# Patient Record
Sex: MALE | Race: WHITE | Employment: FULL TIME | URBAN - METROPOLITAN AREA
[De-identification: names, ages, dates, MRNs, and addresses within clinical notes are randomized per-mention and may not be internally consistent; named-entity substitution may affect disease eponyms.]

---

## 2017-01-24 ENCOUNTER — TELEPHONE (OUTPATIENT)
Dept: FAMILY MEDICINE CLINIC | Age: 52
End: 2017-01-24

## 2017-02-06 ENCOUNTER — OFFICE VISIT (OUTPATIENT)
Dept: FAMILY MEDICINE CLINIC | Age: 52
End: 2017-02-06

## 2017-02-06 VITALS
HEART RATE: 78 BPM | SYSTOLIC BLOOD PRESSURE: 138 MMHG | HEIGHT: 68 IN | TEMPERATURE: 97.7 F | OXYGEN SATURATION: 97 % | BODY MASS INDEX: 33.83 KG/M2 | WEIGHT: 223.2 LBS | DIASTOLIC BLOOD PRESSURE: 90 MMHG

## 2017-02-06 DIAGNOSIS — E66.01 MORBID OBESITY DUE TO EXCESS CALORIES (HCC): ICD-10-CM

## 2017-02-06 DIAGNOSIS — J01.00 ACUTE NON-RECURRENT MAXILLARY SINUSITIS: Primary | ICD-10-CM

## 2017-02-06 DIAGNOSIS — G47.30 SLEEP APNEA, UNSPECIFIED TYPE: ICD-10-CM

## 2017-02-06 DIAGNOSIS — I10 ESSENTIAL HYPERTENSION: ICD-10-CM

## 2017-02-06 PROCEDURE — G8427 DOCREV CUR MEDS BY ELIG CLIN: HCPCS | Performed by: FAMILY MEDICINE

## 2017-02-06 PROCEDURE — 99214 OFFICE O/P EST MOD 30 MIN: CPT | Performed by: FAMILY MEDICINE

## 2017-02-06 PROCEDURE — 1036F TOBACCO NON-USER: CPT | Performed by: FAMILY MEDICINE

## 2017-02-06 PROCEDURE — G8419 CALC BMI OUT NRM PARAM NOF/U: HCPCS | Performed by: FAMILY MEDICINE

## 2017-02-06 PROCEDURE — G8484 FLU IMMUNIZE NO ADMIN: HCPCS | Performed by: FAMILY MEDICINE

## 2017-02-06 RX ORDER — AMOXICILLIN AND CLAVULANATE POTASSIUM 875; 125 MG/1; MG/1
1 TABLET, FILM COATED ORAL 2 TIMES DAILY
Qty: 20 TABLET | Refills: 0 | Status: SHIPPED | OUTPATIENT
Start: 2017-02-06 | End: 2017-02-16

## 2017-02-06 RX ORDER — OXYMETAZOLINE HYDROCHLORIDE 0.05 G/100ML
2 SPRAY NASAL 2 TIMES DAILY
Qty: 14.7 ML | Refills: 3 | Status: SHIPPED | OUTPATIENT
Start: 2017-02-06 | End: 2017-03-24

## 2017-02-06 RX ORDER — AMLODIPINE BESYLATE 5 MG/1
5 TABLET ORAL DAILY
Qty: 30 TABLET | Refills: 3 | Status: SHIPPED | OUTPATIENT
Start: 2017-02-06 | End: 2017-05-28 | Stop reason: SDUPTHER

## 2017-02-06 ASSESSMENT — ENCOUNTER SYMPTOMS
DIARRHEA: 0
SORE THROAT: 0
RHINORRHEA: 0
CONSTIPATION: 0
WHEEZING: 0
ABDOMINAL PAIN: 0
COUGH: 1
SINUS PRESSURE: 1
SHORTNESS OF BREATH: 0

## 2017-02-22 ENCOUNTER — TELEPHONE (OUTPATIENT)
Dept: FAMILY MEDICINE CLINIC | Age: 52
End: 2017-02-22

## 2017-02-22 DIAGNOSIS — Z12.11 COLON CANCER SCREENING: Primary | ICD-10-CM

## 2017-02-22 LAB
CONTROL: YES
HEMOCCULT STL QL: NORMAL

## 2017-02-22 PROCEDURE — 82274 ASSAY TEST FOR BLOOD FECAL: CPT | Performed by: FAMILY MEDICINE

## 2017-03-07 ENCOUNTER — OFFICE VISIT (OUTPATIENT)
Dept: FAMILY MEDICINE CLINIC | Age: 52
End: 2017-03-07

## 2017-03-07 VITALS
TEMPERATURE: 97.9 F | SYSTOLIC BLOOD PRESSURE: 126 MMHG | DIASTOLIC BLOOD PRESSURE: 84 MMHG | RESPIRATION RATE: 18 BRPM | HEART RATE: 76 BPM | WEIGHT: 225 LBS | HEIGHT: 68 IN | BODY MASS INDEX: 34.1 KG/M2 | OXYGEN SATURATION: 99 %

## 2017-03-07 DIAGNOSIS — J01.01 ACUTE RECURRENT MAXILLARY SINUSITIS: Primary | ICD-10-CM

## 2017-03-07 PROCEDURE — G8427 DOCREV CUR MEDS BY ELIG CLIN: HCPCS | Performed by: PHYSICIAN ASSISTANT

## 2017-03-07 PROCEDURE — 99213 OFFICE O/P EST LOW 20 MIN: CPT | Performed by: PHYSICIAN ASSISTANT

## 2017-03-07 PROCEDURE — G8419 CALC BMI OUT NRM PARAM NOF/U: HCPCS | Performed by: PHYSICIAN ASSISTANT

## 2017-03-07 PROCEDURE — G8484 FLU IMMUNIZE NO ADMIN: HCPCS | Performed by: PHYSICIAN ASSISTANT

## 2017-03-07 PROCEDURE — 1036F TOBACCO NON-USER: CPT | Performed by: PHYSICIAN ASSISTANT

## 2017-03-07 RX ORDER — AZITHROMYCIN 250 MG/1
TABLET, FILM COATED ORAL
Qty: 1 PACKET | Refills: 0 | Status: SHIPPED | OUTPATIENT
Start: 2017-03-07 | End: 2017-03-17

## 2017-03-07 ASSESSMENT — ENCOUNTER SYMPTOMS
COUGH: 0
SINUS PRESSURE: 1
SORE THROAT: 0
RHINORRHEA: 1
SHORTNESS OF BREATH: 0
SINUS COMPLAINT: 1

## 2017-03-07 ASSESSMENT — PATIENT HEALTH QUESTIONNAIRE - PHQ9
SUM OF ALL RESPONSES TO PHQ9 QUESTIONS 1 & 2: 0
1. LITTLE INTEREST OR PLEASURE IN DOING THINGS: 0
SUM OF ALL RESPONSES TO PHQ QUESTIONS 1-9: 0
2. FEELING DOWN, DEPRESSED OR HOPELESS: 0

## 2017-03-10 RX ORDER — DICYCLOMINE HCL 20 MG
20 TABLET ORAL 3 TIMES DAILY PRN
Qty: 120 TABLET | Refills: 3 | Status: SHIPPED | OUTPATIENT
Start: 2017-03-10

## 2017-03-24 ENCOUNTER — OFFICE VISIT (OUTPATIENT)
Dept: FAMILY MEDICINE CLINIC | Age: 52
End: 2017-03-24

## 2017-03-24 VITALS
DIASTOLIC BLOOD PRESSURE: 82 MMHG | TEMPERATURE: 99.1 F | WEIGHT: 220.8 LBS | SYSTOLIC BLOOD PRESSURE: 134 MMHG | HEART RATE: 78 BPM | OXYGEN SATURATION: 95 % | BODY MASS INDEX: 33.57 KG/M2

## 2017-03-24 DIAGNOSIS — R09.82 POSTNASAL DRIP: ICD-10-CM

## 2017-03-24 DIAGNOSIS — J01.01 ACUTE RECURRENT MAXILLARY SINUSITIS: ICD-10-CM

## 2017-03-24 DIAGNOSIS — G93.31 POSTVIRAL SYNDROME: Primary | ICD-10-CM

## 2017-03-24 DIAGNOSIS — I10 ESSENTIAL HYPERTENSION: ICD-10-CM

## 2017-03-24 PROCEDURE — 99214 OFFICE O/P EST MOD 30 MIN: CPT | Performed by: FAMILY MEDICINE

## 2017-03-24 PROCEDURE — G8417 CALC BMI ABV UP PARAM F/U: HCPCS | Performed by: FAMILY MEDICINE

## 2017-03-24 PROCEDURE — G8484 FLU IMMUNIZE NO ADMIN: HCPCS | Performed by: FAMILY MEDICINE

## 2017-03-24 PROCEDURE — 1036F TOBACCO NON-USER: CPT | Performed by: FAMILY MEDICINE

## 2017-03-24 PROCEDURE — G8427 DOCREV CUR MEDS BY ELIG CLIN: HCPCS | Performed by: FAMILY MEDICINE

## 2017-03-24 RX ORDER — NAPROXEN 500 MG/1
500 TABLET ORAL 2 TIMES DAILY WITH MEALS
Qty: 60 TABLET | Refills: 3 | Status: SHIPPED | OUTPATIENT
Start: 2017-03-24

## 2017-03-24 RX ORDER — FLUTICASONE PROPIONATE 50 MCG
1 SPRAY, SUSPENSION (ML) NASAL DAILY
Qty: 1 BOTTLE | Refills: 3 | Status: SHIPPED | OUTPATIENT
Start: 2017-03-24 | End: 2018-03-02 | Stop reason: SDUPTHER

## 2017-03-24 RX ORDER — BENZONATATE 100 MG/1
100 CAPSULE ORAL 3 TIMES DAILY PRN
Qty: 21 CAPSULE | Refills: 0 | Status: SHIPPED | OUTPATIENT
Start: 2017-03-24 | End: 2017-03-31

## 2017-03-24 ASSESSMENT — ENCOUNTER SYMPTOMS
SORE THROAT: 0
CONSTIPATION: 0
SHORTNESS OF BREATH: 0
ABDOMINAL PAIN: 0
DIARRHEA: 0
COUGH: 0
WHEEZING: 0
RHINORRHEA: 0

## 2017-03-25 ENCOUNTER — APPOINTMENT (OUTPATIENT)
Dept: GENERAL RADIOLOGY | Age: 52
End: 2017-03-25
Payer: COMMERCIAL

## 2017-03-25 ENCOUNTER — HOSPITAL ENCOUNTER (EMERGENCY)
Age: 52
Discharge: HOME | End: 2017-03-25
Attending: EMERGENCY MEDICINE
Payer: COMMERCIAL

## 2017-03-25 VITALS
HEIGHT: 68 IN | OXYGEN SATURATION: 94 % | TEMPERATURE: 98.2 F | RESPIRATION RATE: 18 BRPM | BODY MASS INDEX: 32.43 KG/M2 | DIASTOLIC BLOOD PRESSURE: 86 MMHG | HEART RATE: 83 BPM | WEIGHT: 214 LBS | SYSTOLIC BLOOD PRESSURE: 123 MMHG

## 2017-03-25 DIAGNOSIS — J01.10 ACUTE FRONTAL SINUSITIS, RECURRENCE NOT SPECIFIED: ICD-10-CM

## 2017-03-25 DIAGNOSIS — J40 BRONCHITIS: Primary | ICD-10-CM

## 2017-03-25 LAB
RAPID INFLUENZA  B AGN: NEGATIVE
RAPID INFLUENZA A AGN: NEGATIVE

## 2017-03-25 PROCEDURE — 86403 PARTICLE AGGLUT ANTBDY SCRN: CPT

## 2017-03-25 PROCEDURE — 71020 XR CHEST STANDARD TWO VW: CPT

## 2017-03-25 PROCEDURE — 6370000000 HC RX 637 (ALT 250 FOR IP): Performed by: EMERGENCY MEDICINE

## 2017-03-25 PROCEDURE — 6360000002 HC RX W HCPCS: Performed by: EMERGENCY MEDICINE

## 2017-03-25 PROCEDURE — 99284 EMERGENCY DEPT VISIT MOD MDM: CPT

## 2017-03-25 PROCEDURE — 94640 AIRWAY INHALATION TREATMENT: CPT

## 2017-03-25 RX ORDER — GUAIFENESIN AND CODEINE PHOSPHATE 100; 10 MG/5ML; MG/5ML
10 SOLUTION ORAL 3 TIMES DAILY PRN
Qty: 200 ML | Refills: 0 | Status: SHIPPED | OUTPATIENT
Start: 2017-03-25 | End: 2017-05-19 | Stop reason: ALTCHOICE

## 2017-03-25 RX ORDER — ALBUTEROL SULFATE 90 UG/1
AEROSOL, METERED RESPIRATORY (INHALATION)
Qty: 1 INHALER | Refills: 0 | Status: SHIPPED | OUTPATIENT
Start: 2017-03-25 | End: 2018-08-31

## 2017-03-25 RX ORDER — DEXAMETHASONE 4 MG/1
8 TABLET ORAL ONCE
Status: COMPLETED | OUTPATIENT
Start: 2017-03-25 | End: 2017-03-25

## 2017-03-25 RX ORDER — AMOXICILLIN AND CLAVULANATE POTASSIUM 875; 125 MG/1; MG/1
1 TABLET, FILM COATED ORAL 2 TIMES DAILY
Qty: 20 TABLET | Refills: 0 | Status: SHIPPED | OUTPATIENT
Start: 2017-03-25 | End: 2017-04-04

## 2017-03-25 RX ORDER — IPRATROPIUM BROMIDE AND ALBUTEROL SULFATE 2.5; .5 MG/3ML; MG/3ML
1 SOLUTION RESPIRATORY (INHALATION) ONCE
Status: COMPLETED | OUTPATIENT
Start: 2017-03-25 | End: 2017-03-25

## 2017-03-25 RX ORDER — PREDNISONE 20 MG/1
20 TABLET ORAL DAILY
Qty: 5 TABLET | Refills: 0 | Status: SHIPPED | OUTPATIENT
Start: 2017-03-25 | End: 2017-03-30

## 2017-03-25 RX ADMIN — DEXAMETHASONE 8 MG: 4 TABLET ORAL at 08:54

## 2017-03-25 RX ADMIN — IPRATROPIUM BROMIDE AND ALBUTEROL SULFATE 1 AMPULE: .5; 3 SOLUTION RESPIRATORY (INHALATION) at 08:42

## 2017-03-25 ASSESSMENT — ENCOUNTER SYMPTOMS
SHORTNESS OF BREATH: 0
FACIAL SWELLING: 0
CONSTIPATION: 0
EYE PAIN: 0
VOMITING: 0
WHEEZING: 1
RHINORRHEA: 1
DIARRHEA: 0
COUGH: 1
BLOOD IN STOOL: 0
CHOKING: 0
EYE REDNESS: 0
SINUS PRESSURE: 1
BACK PAIN: 0
TROUBLE SWALLOWING: 0
ABDOMINAL PAIN: 0
EYE DISCHARGE: 0
CHEST TIGHTNESS: 0
SORE THROAT: 0
STRIDOR: 0
VOICE CHANGE: 0

## 2017-03-25 ASSESSMENT — PAIN SCALES - GENERAL
PAINLEVEL_OUTOF10: 10
PAINLEVEL_OUTOF10: 0

## 2017-03-25 ASSESSMENT — PAIN DESCRIPTION - FREQUENCY: FREQUENCY: INTERMITTENT

## 2017-03-25 ASSESSMENT — PAIN DESCRIPTION - PAIN TYPE: TYPE: ACUTE PAIN

## 2017-03-25 ASSESSMENT — PAIN DESCRIPTION - LOCATION: LOCATION: RIB CAGE

## 2017-03-25 ASSESSMENT — PAIN DESCRIPTION - DESCRIPTORS: DESCRIPTORS: ACHING;BURNING

## 2017-05-16 ENCOUNTER — TELEPHONE (OUTPATIENT)
Dept: FAMILY MEDICINE CLINIC | Age: 52
End: 2017-05-16

## 2017-05-19 ENCOUNTER — OFFICE VISIT (OUTPATIENT)
Dept: FAMILY MEDICINE CLINIC | Age: 52
End: 2017-05-19

## 2017-05-19 VITALS
RESPIRATION RATE: 22 BRPM | HEIGHT: 68 IN | SYSTOLIC BLOOD PRESSURE: 110 MMHG | TEMPERATURE: 97.4 F | WEIGHT: 223.4 LBS | OXYGEN SATURATION: 98 % | HEART RATE: 80 BPM | BODY MASS INDEX: 33.86 KG/M2 | DIASTOLIC BLOOD PRESSURE: 70 MMHG

## 2017-05-19 DIAGNOSIS — J32.9 RECURRENT SINUSITIS: Primary | ICD-10-CM

## 2017-05-19 DIAGNOSIS — G47.33 OSA ON CPAP: ICD-10-CM

## 2017-05-19 DIAGNOSIS — I10 ESSENTIAL HYPERTENSION: ICD-10-CM

## 2017-05-19 DIAGNOSIS — Z99.89 OSA ON CPAP: ICD-10-CM

## 2017-05-19 PROCEDURE — 1036F TOBACCO NON-USER: CPT | Performed by: FAMILY MEDICINE

## 2017-05-19 PROCEDURE — G8417 CALC BMI ABV UP PARAM F/U: HCPCS | Performed by: FAMILY MEDICINE

## 2017-05-19 PROCEDURE — G8427 DOCREV CUR MEDS BY ELIG CLIN: HCPCS | Performed by: FAMILY MEDICINE

## 2017-05-19 PROCEDURE — 99214 OFFICE O/P EST MOD 30 MIN: CPT | Performed by: FAMILY MEDICINE

## 2017-05-19 RX ORDER — AMOXICILLIN AND CLAVULANATE POTASSIUM 875; 125 MG/1; MG/1
1 TABLET, FILM COATED ORAL 2 TIMES DAILY
Qty: 20 TABLET | Refills: 0 | Status: SHIPPED | OUTPATIENT
Start: 2017-05-19 | End: 2017-05-29

## 2017-05-19 ASSESSMENT — ENCOUNTER SYMPTOMS
SHORTNESS OF BREATH: 0
CONSTIPATION: 0
COUGH: 0
ABDOMINAL PAIN: 0
RHINORRHEA: 0
DIARRHEA: 0
SORE THROAT: 0
WHEEZING: 0

## 2017-05-28 DIAGNOSIS — I10 ESSENTIAL HYPERTENSION: ICD-10-CM

## 2017-05-30 RX ORDER — AMLODIPINE BESYLATE 5 MG/1
TABLET ORAL
Qty: 30 TABLET | Refills: 11 | Status: SHIPPED | OUTPATIENT
Start: 2017-05-30 | End: 2018-06-02 | Stop reason: SDUPTHER

## 2017-08-22 ENCOUNTER — HOSPITAL ENCOUNTER (OUTPATIENT)
Age: 52
Setting detail: SPECIMEN
Discharge: HOME | End: 2017-08-22
Payer: COMMERCIAL

## 2017-08-22 ENCOUNTER — OFFICE VISIT (OUTPATIENT)
Dept: FAMILY MEDICINE CLINIC | Age: 52
End: 2017-08-22

## 2017-08-22 VITALS
OXYGEN SATURATION: 98 % | DIASTOLIC BLOOD PRESSURE: 78 MMHG | TEMPERATURE: 98.7 F | HEART RATE: 95 BPM | WEIGHT: 219.8 LBS | SYSTOLIC BLOOD PRESSURE: 110 MMHG | BODY MASS INDEX: 33.42 KG/M2

## 2017-08-22 DIAGNOSIS — F32.A DEPRESSION, UNSPECIFIED DEPRESSION TYPE: Primary | ICD-10-CM

## 2017-08-22 DIAGNOSIS — R35.0 URINARY FREQUENCY: ICD-10-CM

## 2017-08-22 DIAGNOSIS — F41.9 ANXIETY: ICD-10-CM

## 2017-08-22 LAB
BILIRUBIN URINE: NEGATIVE
BLOOD, URINE: NEGATIVE
CLARITY: CLEAR
COLOR: YELLOW
GLUCOSE URINE: NEGATIVE MG/DL
KETONES, URINE: NEGATIVE MG/DL
LEUKOCYTE ESTERASE, URINE: NEGATIVE
NITRITE, URINE: NEGATIVE
PH UA: 5 (ref 5–9)
PROTEIN UA: NEGATIVE MG/DL
SPECIFIC GRAVITY UA: 1.03 (ref 1–1.03)
UROBILINOGEN, URINE: 0.2 E.U./DL

## 2017-08-22 PROCEDURE — 81003 URINALYSIS AUTO W/O SCOPE: CPT

## 2017-08-22 PROCEDURE — 1036F TOBACCO NON-USER: CPT | Performed by: FAMILY MEDICINE

## 2017-08-22 PROCEDURE — G8417 CALC BMI ABV UP PARAM F/U: HCPCS | Performed by: FAMILY MEDICINE

## 2017-08-22 PROCEDURE — 3017F COLORECTAL CA SCREEN DOC REV: CPT | Performed by: FAMILY MEDICINE

## 2017-08-22 PROCEDURE — 99213 OFFICE O/P EST LOW 20 MIN: CPT | Performed by: FAMILY MEDICINE

## 2017-08-22 PROCEDURE — G8427 DOCREV CUR MEDS BY ELIG CLIN: HCPCS | Performed by: FAMILY MEDICINE

## 2017-08-22 RX ORDER — HYDROXYZINE 50 MG/1
50 TABLET, FILM COATED ORAL 3 TIMES DAILY PRN
Qty: 30 TABLET | Refills: 2 | Status: SHIPPED | OUTPATIENT
Start: 2017-08-22 | End: 2017-09-01

## 2017-08-22 ASSESSMENT — ENCOUNTER SYMPTOMS
RHINORRHEA: 0
COUGH: 0
DIARRHEA: 0
ABDOMINAL PAIN: 0
WHEEZING: 0
CONSTIPATION: 0
SORE THROAT: 0
SHORTNESS OF BREATH: 0

## 2017-08-25 DIAGNOSIS — R35.81 NOCTURNAL POLYURIA: Primary | ICD-10-CM

## 2017-08-25 RX ORDER — TAMSULOSIN HYDROCHLORIDE 0.4 MG/1
0.4 CAPSULE ORAL DAILY
Qty: 30 CAPSULE | Refills: 3 | Status: SHIPPED | OUTPATIENT
Start: 2017-08-25 | End: 2017-11-17

## 2017-08-26 ENCOUNTER — HOSPITAL ENCOUNTER (EMERGENCY)
Age: 52
Discharge: HOME | End: 2017-08-26
Attending: EMERGENCY MEDICINE
Payer: COMMERCIAL

## 2017-08-26 VITALS
WEIGHT: 218 LBS | BODY MASS INDEX: 33.04 KG/M2 | TEMPERATURE: 97.7 F | HEART RATE: 77 BPM | DIASTOLIC BLOOD PRESSURE: 90 MMHG | SYSTOLIC BLOOD PRESSURE: 139 MMHG | RESPIRATION RATE: 16 BRPM | HEIGHT: 68 IN | OXYGEN SATURATION: 98 %

## 2017-08-26 DIAGNOSIS — M62.830 SPASM OF BACK MUSCLES: Primary | ICD-10-CM

## 2017-08-26 PROCEDURE — 99282 EMERGENCY DEPT VISIT SF MDM: CPT

## 2017-08-26 PROCEDURE — 6360000002 HC RX W HCPCS: Performed by: EMERGENCY MEDICINE

## 2017-08-26 PROCEDURE — 96372 THER/PROPH/DIAG INJ SC/IM: CPT

## 2017-08-26 RX ORDER — HYDROCODONE BITARTRATE AND ACETAMINOPHEN 5; 325 MG/1; MG/1
1 TABLET ORAL EVERY 6 HOURS PRN
Qty: 15 TABLET | Refills: 0 | Status: SHIPPED | OUTPATIENT
Start: 2017-08-26 | End: 2018-03-02 | Stop reason: SDUPTHER

## 2017-08-26 RX ORDER — DIAZEPAM 5 MG/ML
5 INJECTION, SOLUTION INTRAMUSCULAR; INTRAVENOUS ONCE
Status: COMPLETED | OUTPATIENT
Start: 2017-08-26 | End: 2017-08-26

## 2017-08-26 RX ORDER — HYDROCODONE BITARTRATE AND ACETAMINOPHEN 5; 325 MG/1; MG/1
1 TABLET ORAL EVERY 6 HOURS PRN
Qty: 15 TABLET | Refills: 0 | Status: SHIPPED | OUTPATIENT
Start: 2017-08-26

## 2017-08-26 RX ORDER — KETOROLAC TROMETHAMINE 30 MG/ML
60 INJECTION, SOLUTION INTRAMUSCULAR; INTRAVENOUS ONCE
Status: COMPLETED | OUTPATIENT
Start: 2017-08-26 | End: 2017-08-26

## 2017-08-26 RX ADMIN — KETOROLAC TROMETHAMINE 60 MG: 30 INJECTION, SOLUTION INTRAMUSCULAR at 20:54

## 2017-08-26 RX ADMIN — DIAZEPAM 5 MG: 5 INJECTION, SOLUTION INTRAMUSCULAR; INTRAVENOUS at 20:54

## 2017-08-26 ASSESSMENT — ENCOUNTER SYMPTOMS
EYE PAIN: 0
WHEEZING: 0
BLOOD IN STOOL: 0
VOMITING: 0
TROUBLE SWALLOWING: 0
ABDOMINAL PAIN: 0
COUGH: 0
EYE DISCHARGE: 0
FACIAL SWELLING: 0
STRIDOR: 0
SORE THROAT: 0
DIARRHEA: 0
CHEST TIGHTNESS: 0
SHORTNESS OF BREATH: 0
SINUS PRESSURE: 0
BACK PAIN: 1
CHOKING: 0
VOICE CHANGE: 0
EYE REDNESS: 0
CONSTIPATION: 0

## 2017-08-26 ASSESSMENT — PAIN DESCRIPTION - LOCATION: LOCATION: BACK;GROIN;HIP

## 2017-08-26 ASSESSMENT — PAIN DESCRIPTION - ORIENTATION: ORIENTATION: LEFT

## 2017-08-26 ASSESSMENT — PAIN DESCRIPTION - ONSET: ONSET: GRADUAL

## 2017-08-26 ASSESSMENT — PAIN SCALES - GENERAL: PAINLEVEL_OUTOF10: 10

## 2017-08-26 ASSESSMENT — PAIN DESCRIPTION - FREQUENCY: FREQUENCY: CONTINUOUS

## 2017-11-17 ENCOUNTER — OFFICE VISIT (OUTPATIENT)
Dept: FAMILY MEDICINE CLINIC | Age: 52
End: 2017-11-17

## 2017-11-17 ENCOUNTER — HOSPITAL ENCOUNTER (OUTPATIENT)
Age: 52
Setting detail: SPECIMEN
Discharge: HOME | End: 2017-11-17
Payer: COMMERCIAL

## 2017-11-17 VITALS
WEIGHT: 214 LBS | SYSTOLIC BLOOD PRESSURE: 132 MMHG | RESPIRATION RATE: 14 BRPM | HEART RATE: 92 BPM | DIASTOLIC BLOOD PRESSURE: 84 MMHG | OXYGEN SATURATION: 97 % | HEIGHT: 68 IN | BODY MASS INDEX: 32.43 KG/M2 | TEMPERATURE: 97.9 F

## 2017-11-17 DIAGNOSIS — Z99.89 OSA ON CPAP: ICD-10-CM

## 2017-11-17 DIAGNOSIS — G47.33 OSA ON CPAP: ICD-10-CM

## 2017-11-17 DIAGNOSIS — J01.01 ACUTE RECURRENT MAXILLARY SINUSITIS: ICD-10-CM

## 2017-11-17 DIAGNOSIS — J06.9 ACUTE URI: ICD-10-CM

## 2017-11-17 DIAGNOSIS — R35.81 NOCTURNAL POLYURIA: ICD-10-CM

## 2017-11-17 DIAGNOSIS — M25.511 ACUTE PAIN OF RIGHT SHOULDER: ICD-10-CM

## 2017-11-17 DIAGNOSIS — F32.A ANXIETY AND DEPRESSION: ICD-10-CM

## 2017-11-17 DIAGNOSIS — F41.9 ANXIETY AND DEPRESSION: ICD-10-CM

## 2017-11-17 DIAGNOSIS — R35.81 NOCTURNAL POLYURIA: Primary | ICD-10-CM

## 2017-11-17 LAB — PROSTATE SPECIFIC ANTIGEN: 0.92 NG/ML (ref 0–3.89)

## 2017-11-17 PROCEDURE — 3017F COLORECTAL CA SCREEN DOC REV: CPT | Performed by: FAMILY MEDICINE

## 2017-11-17 PROCEDURE — 84153 ASSAY OF PSA TOTAL: CPT

## 2017-11-17 PROCEDURE — 99214 OFFICE O/P EST MOD 30 MIN: CPT | Performed by: FAMILY MEDICINE

## 2017-11-17 PROCEDURE — 1036F TOBACCO NON-USER: CPT | Performed by: FAMILY MEDICINE

## 2017-11-17 PROCEDURE — G8427 DOCREV CUR MEDS BY ELIG CLIN: HCPCS | Performed by: FAMILY MEDICINE

## 2017-11-17 PROCEDURE — G8484 FLU IMMUNIZE NO ADMIN: HCPCS | Performed by: FAMILY MEDICINE

## 2017-11-17 PROCEDURE — G8417 CALC BMI ABV UP PARAM F/U: HCPCS | Performed by: FAMILY MEDICINE

## 2017-11-17 RX ORDER — TAMSULOSIN HYDROCHLORIDE 0.4 MG/1
0.8 CAPSULE ORAL DAILY
Qty: 60 CAPSULE | Refills: 3 | Status: SHIPPED | OUTPATIENT
Start: 2017-11-17

## 2017-11-17 RX ORDER — GUAIFENESIN 600 MG/1
600 TABLET, EXTENDED RELEASE ORAL 2 TIMES DAILY
Qty: 14 TABLET | Refills: 0 | Status: SHIPPED | OUTPATIENT
Start: 2017-11-17 | End: 2017-11-24

## 2017-11-17 RX ORDER — AMOXICILLIN AND CLAVULANATE POTASSIUM 875; 125 MG/1; MG/1
1 TABLET, FILM COATED ORAL 2 TIMES DAILY
Qty: 20 TABLET | Refills: 0 | Status: SHIPPED | OUTPATIENT
Start: 2017-11-17 | End: 2017-11-27

## 2017-11-17 RX ORDER — PSEUDOEPHEDRINE HCL 120 MG/1
120 TABLET, FILM COATED, EXTENDED RELEASE ORAL EVERY 12 HOURS
Qty: 14 TABLET | Refills: 0 | Status: SHIPPED | OUTPATIENT
Start: 2017-11-17 | End: 2017-11-24

## 2017-11-17 RX ORDER — PAROXETINE HYDROCHLORIDE 20 MG/1
20 TABLET, FILM COATED ORAL DAILY
Qty: 30 TABLET | Refills: 3 | Status: SHIPPED | OUTPATIENT
Start: 2017-11-17 | End: 2018-05-10

## 2017-11-17 ASSESSMENT — ENCOUNTER SYMPTOMS
SINUS PRESSURE: 1
COUGH: 1
DIARRHEA: 0
SHORTNESS OF BREATH: 0
WHEEZING: 0
CONSTIPATION: 0
ABDOMINAL PAIN: 0
SINUS PAIN: 1
RHINORRHEA: 1
SORE THROAT: 1

## 2017-11-17 NOTE — PROGRESS NOTES
tablet 3    CPAP Machine MISC by Does not apply route. With humidifier      HYDROcodone-acetaminophen (NORCO) 5-325 MG per tablet Take 1 tablet by mouth every 6 hours as needed for Pain . 15 tablet 0    albuterol sulfate  (90 BASE) MCG/ACT inhaler Use 2 puffs 4 times daily for 7 days then as needed for wheezing. Dispense with Spacer and instruct in use. At patient's preference may use 60 dose MDI. May Sub Pro-Air or Proventil as needed per insurance. 1 Inhaler 0    fluticasone (FLONASE) 50 MCG/ACT nasal spray 1 spray by Nasal route daily 1 Bottle 3    dicyclomine (BENTYL) 20 MG tablet Take 1 tablet by mouth 3 times daily as needed (as needed) 120 tablet 3     No current facility-administered medications for this visit. ROS:  Review of Systems   Constitutional: Negative for chills and fever. HENT: Positive for congestion, ear pain, rhinorrhea, sinus pain, sinus pressure and sore throat. Respiratory: Positive for cough. Negative for shortness of breath and wheezing. Gastrointestinal: Negative for abdominal pain, constipation and diarrhea. Endocrine: Negative for polydipsia and polyuria. Genitourinary: Negative for dysuria, frequency and urgency. Neurological: Negative for syncope, light-headedness, numbness and headaches. Psychiatric/Behavioral: Negative for sleep disturbance. The patient is not nervous/anxious. Vitals:    11/17/17 0809   BP: 132/84   Site: Right Arm   Position: Sitting   Cuff Size: Medium Adult   Pulse: 92   Resp: 14   Temp: 97.9 °F (36.6 °C)   TempSrc: Oral   SpO2: 97%   Weight: 214 lb (97.1 kg)   Height: 5' 8\" (1.727 m)       Physical exam:  Physical Exam   Constitutional: He is oriented to person, place, and time. He appears well-developed and well-nourished. No distress. HENT:   Head: Normocephalic and atraumatic.    Right Ear: Tympanic membrane, external ear and ear canal normal.   Left Ear: Tympanic membrane, external ear and ear canal normal. Mouth/Throat: No oropharyngeal exudate. Eyes: EOM are normal.   Neck: Normal range of motion. No thyromegaly present. Cardiovascular: Normal rate, regular rhythm and normal heart sounds. No murmur heard. Pulmonary/Chest: Effort normal and breath sounds normal. No respiratory distress. He has no wheezes. Abdominal: Soft. He exhibits no distension. There is no tenderness. There is no rebound and no guarding. Lymphadenopathy:     He has no cervical adenopathy. Neurological: He is alert and oriented to person, place, and time. Skin: Skin is warm and dry. Psychiatric: He has a normal mood and affect. His behavior is normal.   Vitals reviewed. Assessment/Plan:  47 y.o. male here mainly for Mood management:  - Nocturnal polyuria: encouraged decreased fluid at night; increased flomax to max dose; PSA. He prefers to wait be for seeing urology. - Mood: replace the zoloft with paxil he wanted to try because it was helpful for his wife. - URI: discussed conservative measures; provided Rx for augmentin if develops worsening given his hx of recurrent sinus infections. 1. Nocturnal polyuria  PSA, Diagnostic    tamsulosin (FLOMAX) 0.4 MG capsule   2. Acute URI  pseudoephedrine (DECONGESTANT 12HOUR MAX ST) 120 MG extended release tablet    guaiFENesin (MUCINEX) 600 MG extended release tablet   3. MALENA on CPAP     4. Acute pain of right shoulder     5. Acute recurrent maxillary sinusitis  pseudoephedrine (DECONGESTANT 12HOUR MAX ST) 120 MG extended release tablet    guaiFENesin (MUCINEX) 600 MG extended release tablet    amoxicillin-clavulanate (AUGMENTIN) 875-125 MG per tablet   6. Anxiety and depression  PARoxetine (PAXIL) 20 MG tablet        Return if symptoms worsen or fail to improve.     Harley Rosales MD

## 2017-12-29 ENCOUNTER — HOSPITAL ENCOUNTER (OUTPATIENT)
Age: 52
Setting detail: SPECIMEN
Discharge: HOME | End: 2017-12-29
Payer: COMMERCIAL

## 2017-12-29 ENCOUNTER — OFFICE VISIT (OUTPATIENT)
Dept: FAMILY MEDICINE CLINIC | Age: 52
End: 2017-12-29

## 2017-12-29 VITALS
TEMPERATURE: 98.7 F | OXYGEN SATURATION: 99 % | SYSTOLIC BLOOD PRESSURE: 140 MMHG | HEIGHT: 68 IN | BODY MASS INDEX: 33.8 KG/M2 | HEART RATE: 100 BPM | DIASTOLIC BLOOD PRESSURE: 90 MMHG | WEIGHT: 223 LBS

## 2017-12-29 DIAGNOSIS — L72.9 SKIN CYST: ICD-10-CM

## 2017-12-29 DIAGNOSIS — J01.00 ACUTE MAXILLARY SINUSITIS, RECURRENCE NOT SPECIFIED: Primary | ICD-10-CM

## 2017-12-29 DIAGNOSIS — I10 ESSENTIAL HYPERTENSION: ICD-10-CM

## 2017-12-29 PROCEDURE — 3017F COLORECTAL CA SCREEN DOC REV: CPT | Performed by: FAMILY MEDICINE

## 2017-12-29 PROCEDURE — 1036F TOBACCO NON-USER: CPT | Performed by: FAMILY MEDICINE

## 2017-12-29 PROCEDURE — 99214 OFFICE O/P EST MOD 30 MIN: CPT | Performed by: FAMILY MEDICINE

## 2017-12-29 PROCEDURE — G8417 CALC BMI ABV UP PARAM F/U: HCPCS | Performed by: FAMILY MEDICINE

## 2017-12-29 PROCEDURE — 11401 EXC TR-EXT B9+MARG 0.6-1 CM: CPT | Performed by: FAMILY MEDICINE

## 2017-12-29 PROCEDURE — 88304 TISSUE EXAM BY PATHOLOGIST: CPT

## 2017-12-29 PROCEDURE — G8427 DOCREV CUR MEDS BY ELIG CLIN: HCPCS | Performed by: FAMILY MEDICINE

## 2017-12-29 PROCEDURE — G8484 FLU IMMUNIZE NO ADMIN: HCPCS | Performed by: FAMILY MEDICINE

## 2017-12-29 RX ORDER — AMOXICILLIN AND CLAVULANATE POTASSIUM 875; 125 MG/1; MG/1
1 TABLET, FILM COATED ORAL 2 TIMES DAILY
Qty: 20 TABLET | Refills: 0 | Status: SHIPPED | OUTPATIENT
Start: 2017-12-29 | End: 2017-12-29

## 2017-12-29 RX ORDER — DOXYCYCLINE HYCLATE 100 MG
100 TABLET ORAL 2 TIMES DAILY
Qty: 20 TABLET | Refills: 0 | Status: SHIPPED | OUTPATIENT
Start: 2017-12-29 | End: 2018-01-08

## 2017-12-29 ASSESSMENT — ENCOUNTER SYMPTOMS
COUGH: 1
VOMITING: 1
WHEEZING: 0
SINUS PAIN: 1
ABDOMINAL PAIN: 0
SINUS PRESSURE: 1
SHORTNESS OF BREATH: 0
SORE THROAT: 0
CONSTIPATION: 0
DIARRHEA: 0
RHINORRHEA: 1

## 2017-12-29 NOTE — PROGRESS NOTES
[address] [facility] [address] [place] [address]  Dept: [phone]  Dept Fax: : [phone]   Chief Complaint  Chief Complaint   Patient presents with    Sinus Problem     pt has a lot of head congestion and cough for about a week. HPI:   47 y.o. male who presents for URI symptoms:    - URI: x3 Days. Symptoms comprise Cough with brown phegym, n/v with emesis x 2 yesterday, chills at onset that have subsided, congested nose. Denies sore throat, ear pain, other pressure/pain, and post nasal drip. Problem is not gradually improving. Tolerating PO liquids and food. Mother in law sick at home. Taking mucinex and hydrocodone cough syrup at home with minimal relief. Taking garlic, apple cider viniger, and honey. Pt quit smoking 6 years ago. Denies: f/c, n/v. He gets recurrent sinus infections and had been seeing ENT who suggested sinus surgery which he declined. Feeling pressure in the face but no fevers or tenderness. Was treated last month for this but the sinus pressure never went away.     - R arm nodule: Has been in area for years. Stabbing pain off and on increasing the last couple days. No redness at site. Has had a few in the past that were removed. HTN: forgot to take his BP meds today.        Past Medical History:   Diagnosis Date    Back pain     due to injury    Hypertension     Sleep apnea 2014     Past Surgical History:   Procedure Laterality Date    CARDIAC CATHETERIZATION  2004    left heart muscle weak    KNEE SURGERY  age 24    right knee cart. Social History     Social History    Marital status:      Spouse name: N/A    Number of children: N/A    Years of education: N/A     Occupational History    Not on file.      Social History Main Topics    Smoking status: Former Smoker     Packs/day: 0.50     Years: 30.00     Types: Cigarettes     Quit date: 11/4/2013   [address] [place] [address]

## 2018-01-03 ENCOUNTER — TELEPHONE (OUTPATIENT)
Dept: FAMILY MEDICINE CLINIC | Age: 53
End: 2018-01-03

## 2018-01-03 DIAGNOSIS — R05.9 COUGH: Primary | ICD-10-CM

## 2018-01-03 RX ORDER — GUAIFENESIN AND DEXTROMETHORPHAN HYDROBROMIDE 100; 10 MG/5ML; MG/5ML
5 SOLUTION ORAL EVERY 4 HOURS PRN
Qty: 120 ML | Refills: 0 | Status: SHIPPED | OUTPATIENT
Start: 2018-01-03 | End: 2018-03-02 | Stop reason: ALTCHOICE

## 2018-03-02 ENCOUNTER — OFFICE VISIT (OUTPATIENT)
Dept: FAMILY MEDICINE CLINIC | Age: 53
End: 2018-03-02
Payer: COMMERCIAL

## 2018-03-02 VITALS
SYSTOLIC BLOOD PRESSURE: 114 MMHG | BODY MASS INDEX: 34.52 KG/M2 | OXYGEN SATURATION: 98 % | HEART RATE: 80 BPM | TEMPERATURE: 98.1 F | DIASTOLIC BLOOD PRESSURE: 78 MMHG | WEIGHT: 227 LBS

## 2018-03-02 DIAGNOSIS — R05.9 COUGH: ICD-10-CM

## 2018-03-02 DIAGNOSIS — H66.003 ACUTE SUPPURATIVE OTITIS MEDIA OF BOTH EARS WITHOUT SPONTANEOUS RUPTURE OF TYMPANIC MEMBRANES, RECURRENCE NOT SPECIFIED: ICD-10-CM

## 2018-03-02 DIAGNOSIS — R09.82 POSTNASAL DRIP: ICD-10-CM

## 2018-03-02 DIAGNOSIS — J01.90 ACUTE BACTERIAL SINUSITIS: Primary | ICD-10-CM

## 2018-03-02 DIAGNOSIS — B96.89 ACUTE BACTERIAL SINUSITIS: Primary | ICD-10-CM

## 2018-03-02 PROCEDURE — 3017F COLORECTAL CA SCREEN DOC REV: CPT | Performed by: NURSE PRACTITIONER

## 2018-03-02 PROCEDURE — 99213 OFFICE O/P EST LOW 20 MIN: CPT | Performed by: NURSE PRACTITIONER

## 2018-03-02 PROCEDURE — G8427 DOCREV CUR MEDS BY ELIG CLIN: HCPCS | Performed by: NURSE PRACTITIONER

## 2018-03-02 PROCEDURE — G8417 CALC BMI ABV UP PARAM F/U: HCPCS | Performed by: NURSE PRACTITIONER

## 2018-03-02 PROCEDURE — 1036F TOBACCO NON-USER: CPT | Performed by: NURSE PRACTITIONER

## 2018-03-02 PROCEDURE — G8484 FLU IMMUNIZE NO ADMIN: HCPCS | Performed by: NURSE PRACTITIONER

## 2018-03-02 RX ORDER — LEVOCETIRIZINE DIHYDROCHLORIDE 5 MG/1
5 TABLET, FILM COATED ORAL NIGHTLY
Qty: 30 TABLET | Refills: 5 | Status: SHIPPED | OUTPATIENT
Start: 2018-03-02

## 2018-03-02 RX ORDER — TRAMADOL HYDROCHLORIDE 50 MG/1
TABLET ORAL
Refills: 0 | COMMUNITY
Start: 2018-02-01

## 2018-03-02 RX ORDER — GUAIFENESIN AND DEXTROMETHORPHAN HYDROBROMIDE 100; 10 MG/5ML; MG/5ML
5 SOLUTION ORAL EVERY 4 HOURS PRN
Qty: 120 ML | Refills: 0 | Status: SHIPPED | OUTPATIENT
Start: 2018-03-02 | End: 2018-08-31

## 2018-03-02 RX ORDER — FLUTICASONE PROPIONATE 50 MCG
1 SPRAY, SUSPENSION (ML) NASAL DAILY
Qty: 1 BOTTLE | Refills: 5 | Status: SHIPPED | OUTPATIENT
Start: 2018-03-02

## 2018-03-02 RX ORDER — AMOXICILLIN AND CLAVULANATE POTASSIUM 875; 125 MG/1; MG/1
1 TABLET, FILM COATED ORAL 2 TIMES DAILY
Qty: 20 TABLET | Refills: 0 | Status: SHIPPED | OUTPATIENT
Start: 2018-03-02 | End: 2018-03-12

## 2018-03-02 RX ORDER — IBUPROFEN 800 MG/1
TABLET ORAL
Refills: 0 | COMMUNITY
Start: 2018-02-01

## 2018-03-02 ASSESSMENT — ENCOUNTER SYMPTOMS
COUGH: 1
HOARSE VOICE: 1
SINUS PRESSURE: 1
SORE THROAT: 0
SINUS COMPLAINT: 1
SHORTNESS OF BREATH: 0
SWOLLEN GLANDS: 0

## 2018-05-10 ENCOUNTER — OFFICE VISIT (OUTPATIENT)
Dept: FAMILY MEDICINE CLINIC | Age: 53
End: 2018-05-10
Payer: COMMERCIAL

## 2018-05-10 VITALS
BODY MASS INDEX: 35.28 KG/M2 | SYSTOLIC BLOOD PRESSURE: 120 MMHG | WEIGHT: 232.8 LBS | OXYGEN SATURATION: 99 % | HEART RATE: 73 BPM | DIASTOLIC BLOOD PRESSURE: 78 MMHG | HEIGHT: 68 IN | TEMPERATURE: 97.6 F

## 2018-05-10 DIAGNOSIS — I10 ESSENTIAL HYPERTENSION: Primary | ICD-10-CM

## 2018-05-10 PROCEDURE — G8427 DOCREV CUR MEDS BY ELIG CLIN: HCPCS | Performed by: FAMILY MEDICINE

## 2018-05-10 PROCEDURE — G8417 CALC BMI ABV UP PARAM F/U: HCPCS | Performed by: FAMILY MEDICINE

## 2018-05-10 PROCEDURE — 3017F COLORECTAL CA SCREEN DOC REV: CPT | Performed by: FAMILY MEDICINE

## 2018-05-10 PROCEDURE — 1036F TOBACCO NON-USER: CPT | Performed by: FAMILY MEDICINE

## 2018-05-10 PROCEDURE — 99213 OFFICE O/P EST LOW 20 MIN: CPT | Performed by: FAMILY MEDICINE

## 2018-05-10 ASSESSMENT — PATIENT HEALTH QUESTIONNAIRE - PHQ9
1. LITTLE INTEREST OR PLEASURE IN DOING THINGS: 2
2. FEELING DOWN, DEPRESSED OR HOPELESS: 1
SUM OF ALL RESPONSES TO PHQ QUESTIONS 1-9: 3
SUM OF ALL RESPONSES TO PHQ9 QUESTIONS 1 & 2: 3

## 2018-05-10 ASSESSMENT — ENCOUNTER SYMPTOMS
RHINORRHEA: 0
COUGH: 0
ABDOMINAL PAIN: 0
DIARRHEA: 0
SHORTNESS OF BREATH: 0
CONSTIPATION: 0
WHEEZING: 0
SORE THROAT: 0

## 2018-06-02 DIAGNOSIS — I10 ESSENTIAL HYPERTENSION: ICD-10-CM

## 2018-06-02 RX ORDER — AMLODIPINE BESYLATE 5 MG/1
TABLET ORAL
Qty: 30 TABLET | Refills: 5 | Status: SHIPPED | OUTPATIENT
Start: 2018-06-02

## 2018-08-31 ENCOUNTER — OFFICE VISIT (OUTPATIENT)
Dept: FAMILY MEDICINE CLINIC | Age: 53
End: 2018-08-31
Payer: COMMERCIAL

## 2018-08-31 VITALS
SYSTOLIC BLOOD PRESSURE: 120 MMHG | BODY MASS INDEX: 35.01 KG/M2 | HEART RATE: 77 BPM | OXYGEN SATURATION: 96 % | TEMPERATURE: 98.4 F | DIASTOLIC BLOOD PRESSURE: 88 MMHG | HEIGHT: 68 IN | WEIGHT: 231 LBS

## 2018-08-31 DIAGNOSIS — M19.011 PRIMARY OSTEOARTHRITIS OF RIGHT SHOULDER: ICD-10-CM

## 2018-08-31 DIAGNOSIS — I10 ESSENTIAL HYPERTENSION: ICD-10-CM

## 2018-08-31 DIAGNOSIS — J32.9 RECURRENT SINUSITIS: Primary | ICD-10-CM

## 2018-08-31 PROCEDURE — G8417 CALC BMI ABV UP PARAM F/U: HCPCS | Performed by: FAMILY MEDICINE

## 2018-08-31 PROCEDURE — 1036F TOBACCO NON-USER: CPT | Performed by: FAMILY MEDICINE

## 2018-08-31 PROCEDURE — 99214 OFFICE O/P EST MOD 30 MIN: CPT | Performed by: FAMILY MEDICINE

## 2018-08-31 PROCEDURE — G8427 DOCREV CUR MEDS BY ELIG CLIN: HCPCS | Performed by: FAMILY MEDICINE

## 2018-08-31 PROCEDURE — 3017F COLORECTAL CA SCREEN DOC REV: CPT | Performed by: FAMILY MEDICINE

## 2018-08-31 RX ORDER — LEVOFLOXACIN 500 MG/1
500 TABLET, FILM COATED ORAL DAILY
Qty: 10 TABLET | Refills: 0 | Status: SHIPPED | OUTPATIENT
Start: 2018-08-31 | End: 2018-09-10

## 2018-08-31 ASSESSMENT — ENCOUNTER SYMPTOMS
WHEEZING: 0
SINUS PRESSURE: 1
SORE THROAT: 0
SINUS PAIN: 1
CONSTIPATION: 0
ABDOMINAL PAIN: 0
RHINORRHEA: 0
COUGH: 0
SHORTNESS OF BREATH: 0
DIARRHEA: 0

## 2018-08-31 NOTE — PROGRESS NOTES
[address] PRIMARY CARE  [address] [facility] [address]  Dept: [phone]  Dept Fax: [phone]: [phone]   Chief Complaint  Chief Complaint   Patient presents with    Sinusitis     x2 days pt states he has been having drainage and coughing up green mucous. he is taking vicks daytime and nighttime to relieve his coughing symptoms. HPI:  47 y.o. male who presents for URI symptoms:    URI symptoms: x7 days with progressive pain in the sinuses and around the eyes and forehead. Hx of deviated septum. Has been seeing ENT and may be planning surgery at some point. Gets recurrent symptoms. Has tried kailey pot with a little relief. Taking allergy meds. Had been planning R shoulder surgery and preop investigation showed abnormality; Sent for a cath and decided on just medical management. He is connected with [facility] cardiology. Sees Dr. Cordelia Kumar for ortho. Past Medical History:   Diagnosis Date    Back pain     due to injury    Hypertension     Sleep apnea 2014     Past Surgical History:   Procedure Laterality Date    CARDIAC CATHETERIZATION  2004    left heart muscle weak    KNEE SURGERY  age 24    right knee cart. Social History     Social History    Marital status:      Spouse name: N/A    Number of children: N/A    Years of education: N/A     Occupational History    Not on file.      Social History Main Topics    Smoking status: Former Smoker     Packs/day: 0.50     Years: 30.00     Types: Cigarettes     Quit date: 11/4/2013    Smokeless tobacco: Never Used    Alcohol use 1.0 oz/week     2 Standard drinks or equivalent per week    Drug use: No    Sexual activity: Not on file     Other Topics Concern    Not on file     Social History Narrative    No narrative on file     No Known Allergies  Current Outpatient Prescriptions   Medication Sig Dispense Refill    levofloxacin (LEVAQUIN) 500 MG tablet Take 1 tablet by mouth daily for 10 days 10 tablet 0    amLODIPine (NORVASC) 5 MG tablet TAKE 1 TABLET ONCE DAILY 30 tablet 5    ibuprofen (ADVIL;MOTRIN) 800 MG tablet TAKE 1 TABLET THREE TIMES DAILY AS NEEDED  0    traMADol (ULTRAM) 50 MG tablet TAKE 1 TO 2 TABLETS BY MOUTH EVERY 6 TO 8 HOURS  0    fluticasone (FLONASE) 50 MCG/ACT nasal spray 1 spray by Nasal route daily 1 Bottle 5    levocetirizine (XYZAL) 5 MG tablet Take 1 tablet by mouth nightly 30 tablet 5    tamsulosin (FLOMAX) 0.4 MG capsule Take 2 capsules by mouth daily 60 capsule 3    HYDROcodone-acetaminophen (NORCO) 5-325 MG per tablet Take 1 tablet by mouth every 6 hours as needed for Pain . 15 tablet 0    naproxen (NAPROSYN) 500 MG tablet Take 1 tablet by mouth 2 times daily (with meals) 60 tablet 3    dicyclomine (BENTYL) 20 MG tablet Take 1 tablet by mouth 3 times daily as needed (as needed) 120 tablet 3    CPAP Machine MISC by Does not apply route. With humidifier       No current facility-administered medications for this visit. ROS:  Review of Systems   Constitutional: Negative for chills and fever. HENT: Positive for congestion, sinus pain and sinus pressure. Negative for rhinorrhea and sore throat. Respiratory: Negative for cough, shortness of breath and wheezing. Gastrointestinal: Negative for abdominal pain, constipation and diarrhea. Endocrine: Negative for polydipsia and polyuria. Genitourinary: Negative for dysuria, frequency and urgency. Neurological: Negative for syncope, light-headedness, numbness and headaches. Psychiatric/Behavioral: Negative for sleep disturbance. The patient is not nervous/anxious.         Vitals:    08/31/18 1022   BP: 120/88   Site: Right Arm   Position: Sitting   Cuff Size: Large Adult   Pulse: 77   Temp: 98.4 °F (36.9 °C)   TempSrc: Temporal   SpO2: 96%   Weight: 231 lb (104.8 kg)   Height: 5' 8\" (1.727 m)       Physical exam:  Physical Exam   Constitutional: He is oriented to person, place, and time. He appears well-developed and well-nourished. No distress. HENT:   Head: Normocephalic and atraumatic. Nose: Right sinus exhibits maxillary sinus tenderness. Right sinus exhibits no frontal sinus tenderness. Left sinus exhibits maxillary sinus tenderness. Left sinus exhibits no frontal sinus tenderness. Mouth/Throat: No oropharyngeal exudate. Eyes: EOM are normal.   Neck: Normal range of motion. No thyromegaly present. Cardiovascular: Normal rate, regular rhythm and normal heart sounds. No murmur heard. Pulmonary/Chest: Effort normal and breath sounds normal. No respiratory distress. He has no wheezes. Abdominal: Soft. He exhibits no distension. There is no tenderness. There is no rebound and no guarding. Lymphadenopathy:     He has no cervical adenopathy. Neurological: He is alert and oriented to person, place, and time. Skin: Skin is warm and dry. Psychiatric: He has a normal mood and affect. His behavior is normal.   Vitals reviewed. Assessment/Plan:  47 y.o. male here mainly for sinusitis:  - Recurrent sinusitis: course of levaquin.  - HTN: at goal; no changes     Diagnosis Orders   1. Recurrent sinusitis  levofloxacin (LEVAQUIN) 500 MG tablet   2. Essential hypertension     3. Primary osteoarthritis of right shoulder          Return if symptoms worsen or fail to improve.     Jocelyn Ferreira MD

## 2019-05-21 ENCOUNTER — OFFICE VISIT (OUTPATIENT)
Dept: ORTHOPEDIC SURGERY | Age: 54
End: 2019-05-21
Payer: COMMERCIAL

## 2019-05-21 VITALS — HEIGHT: 68 IN | BODY MASS INDEX: 35.02 KG/M2 | WEIGHT: 231.04 LBS

## 2019-05-21 DIAGNOSIS — M25.511 RIGHT SHOULDER PAIN, UNSPECIFIED CHRONICITY: Primary | ICD-10-CM

## 2019-05-21 PROCEDURE — 99203 OFFICE O/P NEW LOW 30 MIN: CPT | Performed by: ORTHOPAEDIC SURGERY

## 2019-05-21 NOTE — PROGRESS NOTES
Chief Complaint:  New Patient (RIGHT SHOULDER/2ND OPN)      History of Present Illness:  Henrique Willett is a  47 y.o. right hand dominant male here regarding right shoulder pain, patient to the care of Ana Bird in [address] [place] [address] recently moved to [facility] and is looking for an orthopedic surgeon in the area. He has extensive orthopedic history pertaining to the left shoulder. Underwent initial rotator cuff repair and biceps tenodesis in January 2018, did not progress well with physical therapy and second MRI was ordered which revealed a recurrent tear, he underwent revision rotator cuff repair in October 2018. After that surgery he remain in the sling for 2 months, following the delayed rotator cuff therapy protocol. As he began to wean out of the sling he noticed continued pain and has been unable to progress active forward flexion. He is here today for second opinion. He denies numbness and tingling down the arm, prior to his rotator cuff injuries he worked as a , has been on disability secondary to the rotator cuff tear since January 2018. He is here today with his wife. Denies any new injuries. Pain Assessment:  Pain Assessment  Location Modifiers: Right  Severity of Pain: 6  Quality of Pain: Throbbing, Other (Comment)  Frequency of Pain: Constant  Aggravating Factors: Other (Comment)  Limiting Behavior: Yes  Relieving Factors: Rest, Ice  Result of Injury: No  Work-Related Injury: No  Are there other pain locations you wish to document?: No    Medical History:  Past Medical History:   Diagnosis Date    Back pain     due to injury    Hypertension     Sleep apnea 2014     Past Surgical History:   Procedure Laterality Date    CARDIAC CATHETERIZATION  2004    left heart muscle weak    KNEE SURGERY  age 24    right knee cart.      Social History     Socioeconomic History    Marital status:      Spouse name: None    Number of children: None    Years of education: None    Highest education level: None   Occupational History    None   Social Needs    Financial resource strain: None    Food insecurity:     Worry: None     Inability: None    Transportation needs:     Medical: None     Non-medical: None   Tobacco Use    Smoking status: Former Smoker     Packs/day: 0.50     Years: 30.00     Pack years: 15.00     Types: Cigarettes     Last attempt to quit: 2013     Years since quittin.5    Smokeless tobacco: Never Used   Substance and Sexual Activity    Alcohol use: Yes     Alcohol/week: 1.0 oz     Types: 2 Standard drinks or equivalent per week    Drug use: No    Sexual activity: None   Lifestyle    Physical activity:     Days per week: None     Minutes per session: None    Stress: None   Relationships    Social connections:     Talks on phone: None     Gets together: None     Attends Religion service: None     Active member of club or organization: None     Attends meetings of clubs or organizations: None     Relationship status: None    Intimate partner violence:     Fear of current or ex partner: None     Emotionally abused: None     Physically abused: None     Forced sexual activity: None   Other Topics Concern    None   Social History Narrative    None     No Known Allergies    Review of Systems:  Constitutional: negative  Respiratory: negative  Cardiovascular: negative  Musculoskeletal:negative except for New Patient (RIGHT SHOULDER/2ND OPN)    Relevant review of systems reviewed and available in the patient's chart in media tab    Vital Signs:  Vitals:    19 0915   Weight: 231 lb 0.7 oz (104.8 kg)   Height: 5' 7.99\" (1.727 m)         General Exam:   Constitutional: Patient is adequately groomed with no evidence of malnutrition  Vascular: Examination reveals no swelling or calf tenderness. Peripheral pulses are palpable and 2+. Neurological: The patient has good coordination. There is no weakness or sensory deficit.     PHYSICAL EXAMINATION: Inspection of the right shoulder reveals warm, dry, intact skin. There is no adenopathy. The distal neurovascular exam is grossly intact. Incisions from previous surgery are well healed. Range of motion is active to 90° of forward flexion, passive forward flexion 180° but with discomfort reaching beyond 80. He has external rotation approximately 30° with his arm at his side, he is able to hold his arm against gravity and light pressure, 4 out of 5 strength with supraspinatus testing, he does have discomfort with this. He has 4+ out of 5 strength with infraspinatus testing and subscapularis testing. Biceps as appropriate contour. Strength is graded 5/5 in all other muscle groups. Examination of the contralateral shoulder reveals no atrophy or deformity. The skin is warm and dry. Range of motion is within normal limits. There is no focal tenderness with palpation. Provocative SLAP, biceps tension, apprehension AC joint or rotator cuff tests are negative. Strength is graded 5/5 in all muscle groups. The distal neurovascular exam is grossly intact. Cervical spine: The skin is warm and dry. There is no swelling, warmth, or erythema. Range of motion is within normal limits. There is no paraspinal or spinous process tenderness. Spurling's sign is negative and did not produce shoulder pain. The distal neurovascular exam is grossly intact. Additional Comments: Sensation is intact to light touch throughout the median, ulnar and radial nerve distribution. Able to wiggle fingers, give thumbs up, A-OK and cross index and middle fingers. X-RAYS:  Four views of the right shoulder are obtained and reviewed. There is no periosteal reaction, medullary lesions, or osteopenia. he has cysts noted in the greater tuberosity stem with previous rotator cuff repair. Button from previous biceps tenodesis is evident and appropriately positioned. Glenohumeral space is maintained however he has mildly high riding humeral head.   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 There is no evidence of acromial fracture. The lung fields are clear. Assessment:  right shoulder continued pain despite revision rotator cuff repair    Impression:  Encounter Diagnosis   Name Primary?  Right shoulder pain, unspecified chronicity Yes       Office Procedures:  Orders Placed This Encounter   Procedures    XR SHOULDER RIGHT (MIN 2 VIEWS)     Standing Status:   Future     Number of Occurrences:   1     Standing Expiration Date:   5/20/2020    MRI SHOULDER RIGHT WO CONTRAST     Standing Status:   Future     Standing Expiration Date:   5/21/2020     Scheduling Instructions:      Juliana hebert            28/30/31     Order Specific Question:   Reason for exam:     Answer:   r/o rotator cuff re tear, assess healing of rotator cuff repair     No orders of the defined types were placed in this encounter. Treatment Plan:  Based on patient's history and physical exam I'm concerned about recurrent rotator cuff tear therefore I would like to obtain an MRI to further evaluate. Once the MRI is obtained the patient will follow up with me for further evaluation and discussion. Patient and his wife agrees with this plan, all of their questions were answered best of our ability and to their satisfaction.         Sayra Klein

## 2019-05-22 ENCOUNTER — TELEPHONE (OUTPATIENT)
Dept: ORTHOPEDIC SURGERY | Age: 54
End: 2019-05-22

## 2019-05-22 NOTE — TELEPHONE ENCOUNTER
LM WITH PATIENT LETTING HIM KNOW HIS INSURANCE PLAN DOES NOT COVER MRI'S. PROVIDED HIM WITH COST OF SELF PAY MRI FOR [facility]. [facility] PAID IN FULL , IF NOT IN FULL 500. [facility] .  PROVIDED MY EXT AND ALSO THE PHONE NUMBERS TO CONTACT [facility] OR [facility]

## 2019-05-23 ENCOUNTER — TELEPHONE (OUTPATIENT)
Dept: ORTHOPEDIC SURGERY | Age: 54
End: 2019-05-23

## 2019-05-23 NOTE — TELEPHONE ENCOUNTER
Spoke with patient and informed him that his insurance does not cover mri or ct, therefore he would have to pay out of pocket for the scan. Patient was under the understanding that his insurance did, so I told him to call and speak with his insurance company and give me a call back.

## 2019-05-28 ENCOUNTER — TELEPHONE (OUTPATIENT)
Dept: ORTHOPEDIC SURGERY | Age: 54
End: 2019-05-28

## 2019-05-29 ENCOUNTER — TELEPHONE (OUTPATIENT)
Dept: ORTHOPEDIC SURGERY | Age: 54
End: 2019-05-29

## 2019-05-29 NOTE — TELEPHONE ENCOUNTER
Patient stated that he will have to wait till he receives a check before he can afford to pay for the mri. I asked patient if he contacted his insurance company about the mri and he said that he was placed on hold and ended up hanging up because he was frustrated. Therefore he has no answer from the insurance company. He was under the impression that his insurance company covered his mri, but I was told via our [address] that his insurance company does not cover mri or ct. Patient said he isnt able to do his every day life activities and he cant. Therefore he is going to contact [employer] company again and if they dont contact him back he will do self pay. Provided him with [employer] number to contact them when and if he decided to do self pay.

## 2019-10-11 ENCOUNTER — HOSPITAL ENCOUNTER (EMERGENCY)
Age: 54
Discharge: HOME | End: 2019-10-11
Attending: EMERGENCY MEDICINE
Payer: COMMERCIAL

## 2019-10-11 VITALS
SYSTOLIC BLOOD PRESSURE: 149 MMHG | HEIGHT: 68 IN | OXYGEN SATURATION: 99 % | RESPIRATION RATE: 18 BRPM | TEMPERATURE: 98.1 F | DIASTOLIC BLOOD PRESSURE: 113 MMHG | BODY MASS INDEX: 34.86 KG/M2 | HEART RATE: 83 BPM | WEIGHT: 230 LBS

## 2019-10-11 DIAGNOSIS — S01.512A TONGUE LACERATION, INITIAL ENCOUNTER: Primary | ICD-10-CM

## 2019-10-11 PROCEDURE — 4500000022 HC ED LEVEL 2 PROCEDURE

## 2019-10-11 PROCEDURE — 99282 EMERGENCY DEPT VISIT SF MDM: CPT

## 2019-10-11 RX ORDER — DIPHENHYDRAMINE HYDROCHLORIDE AND LIDOCAINE HYDROCHLORIDE AND ALUMINUM HYDROXIDE AND MAGNESIUM HYDRO
5 KIT 4 TIMES DAILY PRN
Qty: 1 BOTTLE | Refills: 1 | Status: SHIPPED | OUTPATIENT
Start: 2019-10-11 | End: 2019-10-18

## 2019-10-11 ASSESSMENT — ENCOUNTER SYMPTOMS
TROUBLE SWALLOWING: 0
SORE THROAT: 0

## 2019-10-11 ASSESSMENT — PAIN DESCRIPTION - FREQUENCY: FREQUENCY: CONTINUOUS

## 2019-10-11 ASSESSMENT — PAIN DESCRIPTION - ORIENTATION: ORIENTATION: LEFT;LOWER

## 2019-10-11 ASSESSMENT — PAIN SCALES - GENERAL: PAINLEVEL_OUTOF10: 10

## 2019-10-11 ASSESSMENT — PAIN DESCRIPTION - LOCATION: LOCATION: OTHER (COMMENT)

## 2019-10-11 ASSESSMENT — PAIN DESCRIPTION - PROGRESSION: CLINICAL_PROGRESSION: NOT CHANGED

## 2019-10-11 ASSESSMENT — PAIN - FUNCTIONAL ASSESSMENT: PAIN_FUNCTIONAL_ASSESSMENT: ACTIVITIES ARE NOT PREVENTED

## 2019-10-11 ASSESSMENT — PAIN DESCRIPTION - DESCRIPTORS: DESCRIPTORS: STABBING

## 2019-10-11 ASSESSMENT — PAIN DESCRIPTION - PAIN TYPE: TYPE: ACUTE PAIN

## 2019-10-11 ASSESSMENT — PAIN DESCRIPTION - ONSET: ONSET: SUDDEN

## 2020-12-29 ENCOUNTER — OFFICE VISIT (OUTPATIENT)
Dept: ORTHOPEDIC SURGERY | Age: 55
End: 2020-12-29
Payer: COMMERCIAL

## 2020-12-29 VITALS — BODY MASS INDEX: 34.86 KG/M2 | HEIGHT: 68 IN | WEIGHT: 230 LBS
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
## 2021-01-04 PROBLEM — S46.912A LEFT SHOULDER STRAIN, INITIAL ENCOUNTER: Status: ACTIVE | Noted: 2021-01-04

## 2021-01-05 ENCOUNTER — OFFICE VISIT (OUTPATIENT)
Dept: ORTHOPEDIC SURGERY | Age: 56
End: 2021-01-05
Payer: COMMERCIAL

## 2021-01-05 VITALS — BODY MASS INDEX: 34.86 KG/M2 | WEIGHT: 230 LBS | HEIGHT: 68 IN

## 2021-01-05 DIAGNOSIS — S46.912A LEFT SHOULDER STRAIN, INITIAL ENCOUNTER: ICD-10-CM

## 2021-01-05 DIAGNOSIS — M25.512 LEFT SHOULDER PAIN, UNSPECIFIED CHRONICITY: ICD-10-CM

## 2021-01-05 DIAGNOSIS — S92.354A NONDISPLACED FRACTURE OF FIFTH METATARSAL BONE, RIGHT FOOT, INITIAL ENCOUNTER FOR CLOSED FRACTURE: Primary | ICD-10-CM

## 2021-01-05 PROCEDURE — 99213 OFFICE O/P EST LOW 20 MIN: CPT | Performed by: ORTHOPAEDIC SURGERY

## 2021-01-05 PROCEDURE — 29405 APPL SHORT LEG CAST: CPT | Performed by: ORTHOPAEDIC SURGERY
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
## 2021-01-06 ENCOUNTER — TELEPHONE (OUTPATIENT)
Dept: ORTHOPEDIC SURGERY | Age: 56
End: 2021-01-06

## 2021-01-06 NOTE — TELEPHONE ENCOUNTER
Called & spoke with the patient, informing him that his MRI for the left shoulder was denied. Patient was told by me from our pre-cert department that His case  says it's bc the issue with his left shoulder was pre-existing   and LT shoulder shouldn't be listed under our specialty comments. .   I have a list of diagnosis she gave me, I called to try and get it approved with no luck. .   I hoped this helped, this is all the info from his case . .   J96.00   S92.354A   S00.03XA   T50.905A and   S80. 02XA     This information is from [employer]. Patient was recommended to talk with his  and determine what can be done for it. He was understanding of the situation and we will be keeping everything updated as best as we can. As of right now though, his MRI for the left shoulder is denied.

## 2021-01-12 ENCOUNTER — OFFICE VISIT (OUTPATIENT)
Dept: ORTHOPEDIC SURGERY | Age: 56
End: 2021-01-12
Payer: COMMERCIAL

## 2021-01-12 VITALS — BODY MASS INDEX: 34.86 KG/M2 | WEIGHT: 230 LBS | HEIGHT: 68 IN

## 2021-01-12 DIAGNOSIS — M25.562 LEFT KNEE PAIN, UNSPECIFIED CHRONICITY: Primary | ICD-10-CM

## 2021-01-12 DIAGNOSIS — S86.912A KNEE STRAIN, LEFT, INITIAL ENCOUNTER: ICD-10-CM

## 2021-01-12 PROCEDURE — 99213 OFFICE O/P EST LOW 20 MIN: CPT | Performed by: ORTHOPAEDIC SURGERY
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
## 2021-01-21 ENCOUNTER — TELEPHONE (OUTPATIENT)
Dept: ORTHOPEDIC SURGERY | Age: 56
End: 2021-01-21

## 2021-01-21 NOTE — TELEPHONE ENCOUNTER
Called & spoke with the patient informing him that his MRI for both the left shoulder & left knee has been approved through [facility]. I informed him that both of his MRI's will be done on 2/1/2021 at [facility]. Patient will do the left shoulder first, he needs to arrive at 8AM to get checked in and then the scan will begin and after that scan, his MRI of the knee was to be done after the MRI of the shoulder. Patient understood. He will follow up with Doctor Marizol Bae after the MRIs are completed.

## 2021-01-26 ENCOUNTER — OFFICE VISIT (OUTPATIENT)
Dept: ORTHOPEDIC SURGERY | Age: 56
End: 2021-01-26
Payer: COMMERCIAL

## 2021-01-26 VITALS — HEIGHT: 68 IN | BODY MASS INDEX: 34.86 KG/M2 | WEIGHT: 230 LBS

## 2021-01-26 DIAGNOSIS — S92.354A NONDISPLACED FRACTURE OF FIFTH METATARSAL BONE, RIGHT FOOT, INITIAL ENCOUNTER FOR CLOSED FRACTURE: Primary | ICD-10-CM

## 2021-01-26 PROCEDURE — 99213 OFFICE O/P EST LOW 20 MIN: CPT | Performed by: ORTHOPAEDIC SURGERY

## 2021-01-26 PROCEDURE — L4361 PNEUMA/VAC WALK BOOT PRE OTS: HCPCS | Performed by: ORTHOPAEDIC SURGERY
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
## 2021-02-01 ENCOUNTER — HOSPITAL ENCOUNTER (OUTPATIENT)
Dept: MRI IMAGING | Age: 56
Discharge: HOME | End: 2021-02-01
Payer: COMMERCIAL

## 2021-02-01 DIAGNOSIS — S86.912A KNEE STRAIN, LEFT, INITIAL ENCOUNTER: ICD-10-CM

## 2021-02-01 DIAGNOSIS — S46.912A LEFT SHOULDER STRAIN, INITIAL ENCOUNTER: ICD-10-CM

## 2021-02-01 DIAGNOSIS — M25.562 LEFT KNEE PAIN, UNSPECIFIED CHRONICITY: ICD-10-CM

## 2021-02-01 PROCEDURE — 73721 MRI JNT OF LWR EXTRE W/O DYE: CPT

## 2021-02-01 PROCEDURE — 73221 MRI JOINT UPR EXTREM W/O DYE: CPT

## 2021-02-19 ENCOUNTER — HOSPITAL ENCOUNTER (OUTPATIENT)
Age: 56
Discharge: HOME | End: 2021-02-19
Payer: COMMERCIAL

## 2021-02-19 ENCOUNTER — HOSPITAL ENCOUNTER (OUTPATIENT)
Dept: GENERAL RADIOLOGY | Age: 56
Discharge: HOME | End: 2021-02-19
Payer: COMMERCIAL

## 2021-02-19 DIAGNOSIS — S92.354D CLOSED NONDISPLACED FRACTURE OF FIFTH METATARSAL BONE OF RIGHT FOOT WITH ROUTINE HEALING: ICD-10-CM

## 2021-02-19 PROCEDURE — 73630 X-RAY EXAM OF FOOT: CPT

## 2021-03-02 ENCOUNTER — HOSPITAL ENCOUNTER (OUTPATIENT)
Dept: PHYSICAL THERAPY | Age: 56
Setting detail: THERAPIES SERIES
Discharge: HOME | End: 2021-03-02
Payer: COMMERCIAL

## 2021-03-02 PROCEDURE — 97161 PT EVAL LOW COMPLEX 20 MIN: CPT

## 2021-03-02 PROCEDURE — 97110 THERAPEUTIC EXERCISES: CPT

## 2021-03-02 PROCEDURE — 97140 MANUAL THERAPY 1/> REGIONS: CPT

## 2021-03-02 NOTE — FLOWSHEET NOTE
[facility], [address] [place] [address] [address], [address]  Phone: [phone], [phone]    Physical Therapy Treatment Note/ Progress Report:     Date:  3/2/2021    Patient Name:  David Grajeda    :  1965  MRN: [id number]  Restrictions/Precautions:    Medical/Treatment Diagnosis Information:  · Diagnosis: Closed Non-Displaced Fx 5th Metatarsal R Foot  · Treatment Diagnosis: V04.753T  Insurance/Certification information:  PT Insurance Information: [facility]  Physician Information:  Referring Practitioner: Shell Luna  Has the plan of care been signed (Y/N):        []  Yes  [x]  No     Date of Patient follow up with Physician:     Is this a Progress Report:     []  Yes  [x]  No      If Yes:  Date Range for reporting period:  Initial Eval: 3/2/2021  Beginning: 3/2/2021 --- Endin2021    Progress report will be due (10 Rx or 30 days whichever is less): 7/3/5154     Recertification will be due (POC Duration  / 90 days whichever is less): 2021      Visit # Insurance Allowable Auth Required   In Person 1 12 visits   (2021-2021) []  Yes     []  No    Tele Health -  []  Yes     []  No    Total 1       Functional Scale: LEFS: 75% (Score: 20/80)   Date assessed: 3/2/2021      Latex Allergy:  [x]NO      []YES  Preferred Language for Healthcare:   [x]English       []other:    Pain level:  10/10     SUBJECTIVE:  See eval    OBJECTIVE: See eval   Observation:    Test measurements:      RESTRICTIONS/PRECAUTIONS: WBAT transition out of Boot     Exercises/Interventions:   Therapeutic Ex (43458)  Therapeutic Activity (33645)  NMR re-education (02893) Sets/Reps Notes/CUES   Bike          Ankle AROM 20 x ea ??, ??    Ankle Circles 20 x  CW, CCW        Long Sit HS Stretch 3 x 30\"    Long Sit Gastroc Stretch 3 x 30\"         Seated DF/PF  15 x 5\"    Toe Towel Curls 30 x    Towel ER/IR 20 x ea    Seated DF Stretch 10 x 10\"    LAQ with DF           Weight Shifts  15 x ea ??, ?? Manual Intervention (50190)     Joint Mobs 10'    PROM  10'                             [address] access code:   Access Code: [email or web address]   URL: [email or web address]   Date: 03/02/2021   Prepared by: Kyle Dove     Exercises   Seated Ankle Alphabet - 10 reps - 3 sets - 1x daily - 7x weekly   Seated Toe Raise - 10 reps - 3 sets - 1x daily - 7x weekly   Seated Heel Raise - 10 reps - 3 sets - 1x daily - 7x weekly   Supine Ankle Dorsiflexion and Plantarflexion AROM - 10 reps - 3 sets - 1x daily - 7x weekly   Supine Ankle Inversion AROM - 10 reps - 3 sets - 1x daily - 7x weekly   Supine Ankle Eversion AROM - 10 reps - 3 sets - 1x daily - 7x weekly   Seated Toe Curl - 10 reps - 3 sets - 1x daily - 7x weekly   Seated Long Arc Quad - 10 reps - 3 sets - 1x daily - 7x weekly   Long Sitting Calf Stretch with Strap - 5 reps - 1 sets - 30 hold - 1x daily - 7x weekly   Seated Table Hamstring Stretch - 5 reps - 1 sets - 30 hold - 1x daily - 7x weekly   Seated Ankle Dorsiflexion Stretch - 5 reps - 1 sets - 30 hold - 1x daily - 7x weekly   Ankle Inversion Eversion Towel Slide - 10 reps - 3 sets - 1x daily - 7x weekly   Seated Self Great Toe Stretch - 10 reps - 3 sets - 1x daily - 7x weekly                Patient Education         Therapeutic Exercise and NMR EXR  [x] (02324) Provided verbal/tactile cueing for activities related to strengthening, flexibility, endurance, ROM for improvements in LE, proximal hip, and core control with self care, mobility, lifting, ambulation. [x] (66379) Provided verbal/tactile cueing for activities related to improving balance, coordination, kinesthetic sense, posture, motor skill, proprioception to assist with LE, proximal hip, and core control in self-care, mobility, lifting, ambulation and eccentric single leg control.      NMR and Therapeutic Activities:    [x] (91651 or 22840) Provided verbal/tactile cueing for activities related to improving balance, coordination, kinesthetic sense, posture, motor skill, proprioception and motor activation to allow for proper function of core, proximal hip and LE with self-care and ADLs and functional mobility. [x] (16088) Gait Re-education- Provided training and instruction to the patient for proper LE, core and proximal hip recruitment and positioning and eccentric body weight control with ambulation re-education including up and down stairs     Home Exercise Program:    [x] (78634) Reviewed/Progressed HEP activities related to strengthening, flexibility, endurance, ROM of core, proximal hip and LE for functional self-care, mobility, lifting and ambulation/stair navigation   [x] (00715) Reviewed/Progressed HEP activities related to improving balance, coordination, kinesthetic sense, posture, motor skill, proprioception of core, proximal hip and LE for self-care, mobility, lifting, and ambulation/stair navigation      Manual Treatments:  PROM / STM / Oscillations-Mobs:  G-I, II, III, IV (PA's, Inf., Post.)  [x] (69634) Provided manual therapy to mobilize LE, proximal hip and/or LS spine soft tissue/joints for the purpose of modulating pain, promoting relaxation, increasing ROM, reducing/eliminating soft tissue swelling/inflammation/restriction, improving soft tissue extensibility and allowing for proper ROM for normal function with self-care, mobility, lifting and ambulation.      Modalities:      Charges:  Timed Code Treatment Minutes: 40'   Total Treatment Minutes:  60'   BWC:  TE TIME:  NMR TIME:  MANUAL TIME:  UNTIMED MINUTES:  Medicare Total:  10:30-11:30  10:55-11:15  -  11:115-11:30  10:30-10:55  -      [x] EVAL (LOW) 71483 (typically 20 minutes face-to-face)  [] EVAL (MOD) 00359 (typically 30 minutes face-to-face)  [] EVAL (HIGH) 16999 (typically 45 minutes face-to-face)  [] RE-EVAL     [x] NZ(31070) x  2   [] IONTO  [] NMR (20010) x     [] VASO  [x] Manual (04250) x  1   [] Other:  [] TA x      [] Mech Traction (47427)  [] ES(attended) (30548)      [] ES (un) (70622):    ASSESSMENT:  See eval    GOALS:   Short Term Goals: To be achieved in: 2 weeks  1. Independent in HEP and progression per patient tolerance, in order to prevent re-injury. []? Progressing: []? Met: []? Not Met: []? Adjusted       2. Patient will have a decrease in pain to facilitate improvement in movement, function, and ADLs as indicated by Functional Deficits. []? Progressing: []? Met: []? Not Met: []? Adjusted          Long Term Goals: To be achieved in: 12 weeks  1. Disability index score of 20% or less for the LEFS to assist with reaching prior level of function. []? Progressing: []? Met: []? Not Met: []? Adjusted      2. Patient will demonstrate increased AROM to equal the opposite side bilaterally to allow for proper joint functioning as indicated by patients Functional Deficits. []? Progressing: []? Met: []? Not Met: []? Adjusted       3. Patient will demonstrate an increase in strength of R Ankle  = L Ankle to allow for proper functional mobility as indicated by patients Functional Deficits. []? Progressing: []? Met: []? Not Met: []? Adjusted       4. Patient will return to all transfers, work activities, and functional activities without increased symptoms or restriction. []? Progressing: []? Met: []? Not Met: []? Adjusted       5. Patient will have 0/10 pain with ADL's.  []? Progressing: []? Met: []? Not Met: []? Adjusted       6. Patient stated goal:To be able to walk normal without boot and decrease pain   []? Progressing: []? Met: []? Not Met: []? Adjusted                        Overall Progression Towards Functional goals/ Treatment Progress Update:  [] Patient is progressing as expected towards functional goals listed. [] Progression is slowed due to complexities/Impairments listed. [] Progression has been slowed due to co-morbidities.   [x] Plan just implemented, too soon to assess goals progression <30days   [] Goals require adjustment due to lack of progress  [] Patient is not progressing as expected and requires additional follow up with physician  [] Other    Prognosis for POC: [x] Good [] Fair  [] Poor    Patient requires continued skilled intervention: [x] Yes  [] No    Treatment/Activity Tolerance:  [x] Patient able to complete treatment  [] Patient limited by fatigue  [] Patient limited by pain    [] Patient limited by other medical complications  [] Other:     Return to Play: (if applicable)   []  Stage 1: Intro to Strength   []  Stage 2: Return to Run and Strength   []  Stage 3: Return to Jump and Strength   []  Stage 4: Dynamic Strength and Agility   []  Stage 5: Sport Specific Training     []  Ready to Return to Play, Meets All Above Stages   []  Not Ready for Return to Sports   Comments:                         PLAN: See eval  [] Continue per plan of care [] Alter current plan (see comments above)  [x] Plan of care initiated [] Hold pending MD visit [] Discharge    Electronically signed by:  Tyesha Ribeiro PT    Note: If patient does not return for scheduled/ recommended follow up visits, this note will serve as a discharge from care along with most recent update on progress.
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
## 2021-03-05 ENCOUNTER — HOSPITAL ENCOUNTER (OUTPATIENT)
Dept: PHYSICAL THERAPY | Age: 56
Setting detail: THERAPIES SERIES
Discharge: HOME | End: 2021-03-05
Payer: COMMERCIAL

## 2021-03-08 ENCOUNTER — HOSPITAL ENCOUNTER (OUTPATIENT)
Dept: PHYSICAL THERAPY | Age: 56
Setting detail: THERAPIES SERIES
Discharge: HOME | End: 2021-03-08
Payer: COMMERCIAL

## 2021-03-08 PROCEDURE — 97016 VASOPNEUMATIC DEVICE THERAPY: CPT | Performed by: PHYSICAL THERAPY ASSISTANT

## 2021-03-08 PROCEDURE — 97140 MANUAL THERAPY 1/> REGIONS: CPT | Performed by: PHYSICAL THERAPY ASSISTANT

## 2021-03-08 PROCEDURE — 97110 THERAPEUTIC EXERCISES: CPT | Performed by: PHYSICAL THERAPY ASSISTANT

## 2021-03-08 NOTE — FLOWSHEET NOTE
[facility], [address] [place] [address] [address], [address]  Phone: [phone], [phone]    Physical Therapy Treatment Note/ Progress Report:     Date:  3/8/2021    Patient Name:  Licha Rajan    :  1965  MRN: [id number]  Restrictions/Precautions:    Medical/Treatment Diagnosis Information:  · Diagnosis: Closed Non-Displaced Fx 5th Metatarsal R Foot  · Treatment Diagnosis: N50.597L  Insurance/Certification information:  PT Insurance Information: [facility]  Physician Information:  Referring Practitioner: Dillon Moreno  Has the plan of care been signed (Y/N):        []  Yes  [x]  No     Date of Patient follow up with Physician:     Is this a Progress Report:     []  Yes  [x]  No      If Yes:  Date Range for reporting period:  Initial Eval: 3/2/2021  Beginning: 3/2/2021 --- Endin2021    Progress report will be due (10 Rx or 30 days whichever is less): 400     Recertification will be due (POC Duration  / 90 days whichever is less): 2021      Visit # Insurance Allowable Auth Required   In Person 2 12 visits   (2021-2021) []  Yes     []  No    Tele Health -  []  Yes     []  No    Total 2       Functional Scale: LEFS: 75% (Score: 20/80)   Date assessed: 3/2/2021      Latex Allergy:  [x]NO      []YES  Preferred Language for Healthcare:   [x]English       []other:    Pain level:  10/10     SUBJECTIVE:  Presents out of the boot today as he drove back from [place] this morning. He has been on his feet a lot this weekend secondary to a death in the family. Weaning in and out of the boot. Gets some popping in the lateral aspect of his foot with ROM. Popping is painful and loud. Using a lot of ice.       OBJECTIVE: See eval   Observation:    Test measurements:      RESTRICTIONS/PRECAUTIONS: WBAT transition out of Boot     Exercises/Interventions:   Therapeutic Ex (91889)  Therapeutic Activity (57192)  NMR re-education (50811) Sets/Reps Notes/CUES   Bike          Ankle AROM 20 x ea ??, ?? Ankle Circles 20 x  CW, CCW        Long Sit HS Stretch 3 x 30\"    Long Sit Gastroc Stretch / Soleus stretch  3 x 30\" ea              Toe Towel Curls 2'    Towel ER/IR 20 x ea    Seated 1/2 moon DF / PF  2'    Seated 1/2 moon INV/EVR 2'    Seated HR  x30                   LAQ with DF  2# x20 3\" hold         T-Band DF/PF  INV/EVR Red x20 ea               Weight Shifts  15 x ea ??, ??                                                 Manual Intervention (28015)     Joint Mobs 10'    PROM  10'                             Medbridge access code:   Access Code: [email or web address]   URL: [email or web address]   Date: 03/02/2021   Prepared by: Arjun Carter     Exercises   Seated Ankle Alphabet - 10 reps - 3 sets - 1x daily - 7x weekly   Seated Toe Raise - 10 reps - 3 sets - 1x daily - 7x weekly   Seated Heel Raise - 10 reps - 3 sets - 1x daily - 7x weekly   Supine Ankle Dorsiflexion and Plantarflexion AROM - 10 reps - 3 sets - 1x daily - 7x weekly   Supine Ankle Inversion AROM - 10 reps - 3 sets - 1x daily - 7x weekly   Supine Ankle Eversion AROM - 10 reps - 3 sets - 1x daily - 7x weekly   Seated Toe Curl - 10 reps - 3 sets - 1x daily - 7x weekly   Seated Long Arc Quad - 10 reps - 3 sets - 1x daily - 7x weekly   Long Sitting Calf Stretch with Strap - 5 reps - 1 sets - 30 hold - 1x daily - 7x weekly   Seated Table Hamstring Stretch - 5 reps - 1 sets - 30 hold - 1x daily - 7x weekly   Seated Ankle Dorsiflexion Stretch - 5 reps - 1 sets - 30 hold - 1x daily - 7x weekly   Ankle Inversion Eversion Towel Slide - 10 reps - 3 sets - 1x daily - 7x weekly   Seated Self Great Toe Stretch - 10 reps - 3 sets - 1x daily - 7x weekly                Patient Education         Therapeutic Exercise and NMR EXR  [x] (22606) Provided verbal/tactile cueing for activities related to strengthening, flexibility, endurance, ROM for improvements in LE, proximal hip, and core control with self care, mobility, lifting, ambulation.   [x] (04030) TIME:  UNTIMED MINUTES:  Medicare Total:  10:15-11:15  10:15-10:50    10:50-11:05  11:05-11:15    -      [] EVAL (LOW) 20679 (typically 20 minutes face-to-face)  [] EVAL (MOD) 96522 (typically 30 minutes face-to-face)  [] EVAL (HIGH) 76474 (typically 45 minutes face-to-face)  [] RE-EVAL     [x] OO(48147) x  2   [] IONTO  [] NMR (35532) x     [x] VASO  [x] Manual (48562) x  1   [] Other:  [] TA x      [] Mech Traction (33646)  [] ES(attended) (02599)      [] ES (un) (55373):    ASSESSMENT:  See eval    GOALS:   Short Term Goals: To be achieved in: 2 weeks  1. Independent in HEP and progression per patient tolerance, in order to prevent re-injury. [x]? Progressing: []? Met: []? Not Met: []? Adjusted       2. Patient will have a decrease in pain to facilitate improvement in movement, function, and ADLs as indicated by Functional Deficits. [x]? Progressing: []? Met: []? Not Met: []? Adjusted          Long Term Goals: To be achieved in: 12 weeks  1. Disability index score of 20% or less for the LEFS to assist with reaching prior level of function. [x]? Progressing: []? Met: []? Not Met: []? Adjusted      2. Patient will demonstrate increased AROM to equal the opposite side bilaterally to allow for proper joint functioning as indicated by patients Functional Deficits. [x]? Progressing: []? Met: []? Not Met: []? Adjusted       3. Patient will demonstrate an increase in strength of R Ankle  = L Ankle to allow for proper functional mobility as indicated by patients Functional Deficits. [x]? Progressing: []? Met: []? Not Met: []? Adjusted       4. Patient will return to all transfers, work activities, and functional activities without increased symptoms or restriction. [x]? Progressing: []? Met: []? Not Met: []? Adjusted       5. Patient will have 0/10 pain with ADL's. [x]? Progressing: []? Met: []? Not Met: []? Adjusted       6. Patient stated goal:To be able to walk normal without boot and decrease pain   [x]?

## 2021-03-11 ENCOUNTER — HOSPITAL ENCOUNTER (OUTPATIENT)
Dept: PHYSICAL THERAPY | Age: 56
Setting detail: THERAPIES SERIES
Discharge: HOME | End: 2021-03-11
Payer: COMMERCIAL

## 2021-03-11 NOTE — FLOWSHEET NOTE
[facility]    Physical Therapy  Cancellation/No-show Note  Patient Name:  Paulina Briceno  :  1965   Date:  3/11/2021    Cancelled visits to date: 1  No-shows to date: 1    For today's appointment patient:  [x]  Cancelled  []  Rescheduled appointment  []  No-show     Reason given by patient:  []  Patient ill  []  Conflicting appointment  []  No transportation    []  Conflict with work  []  No reason given  [x]  Other:     Comments: Thought his appt was at 11    Phone call information:   []  Phone call made today to patient. []  Patient answered, conversation as follows:    []  Patient did not answer. []  Phone call not made today  [x]  Phone call not needed - pt contacted us to cancel and provided reason for cancellation.      Electronically signed by:  Hailey Leos, PT,MPT,ATC

## 2021-03-15 ENCOUNTER — HOSPITAL ENCOUNTER (OUTPATIENT)
Dept: PHYSICAL THERAPY | Age: 56
Setting detail: THERAPIES SERIES
Discharge: HOME | End: 2021-03-15
Payer: COMMERCIAL

## 2021-03-15 PROCEDURE — 97110 THERAPEUTIC EXERCISES: CPT | Performed by: PHYSICAL THERAPY ASSISTANT

## 2021-03-15 PROCEDURE — 97140 MANUAL THERAPY 1/> REGIONS: CPT | Performed by: PHYSICAL THERAPY ASSISTANT

## 2021-03-15 PROCEDURE — 97112 NEUROMUSCULAR REEDUCATION: CPT | Performed by: PHYSICAL THERAPY ASSISTANT

## 2021-03-15 NOTE — FLOWSHEET NOTE
[facility],  [place] [address] [address], [address]  Phone: [phone], [phone]    Physical Therapy Treatment Note/ Progress Report:     Date:  3/15/2021    Patient Name:  Marnell Landau    :  1965  MRN: [id number]  Restrictions/Precautions:    Medical/Treatment Diagnosis Information:  · Diagnosis: Closed Non-Displaced Fx 5th Metatarsal R Foot  · Treatment Diagnosis: O93.537A  Insurance/Certification information:  PT Insurance Information: [facility]  Physician Information:  Referring Practitioner: Merary Monet  Has the plan of care been signed (Y/N):        []  Yes  [x]  No     Date of Patient follow up with Physician:     Is this a Progress Report:     []  Yes  [x]  No      If Yes:  Date Range for reporting period:  Initial Eval: 3/2/2021  Beginning: 3/2/2021 --- Endin2021    Progress report will be due (10 Rx or 30 days whichever is less): 5247     Recertification will be due (POC Duration  / 90 days whichever is less): 2021      Visit # Insurance Allowable Auth Required   In Person 3 12 visits   (2021-2021) []  Yes     []  No    Tele Health -  []  Yes     []  No    Total 3       Functional Scale: LEFS: 75% (Score: 20/80)   Date assessed: 3/2/2021      Latex Allergy:  [x]NO      []YES  Preferred Language for Healthcare:   [x]English       []other:    Pain level:  8/10     SUBJECTIVE:  Presents out of the boot. States that he is not getting any better and is swollen all the time. States he is still in and out of the boot. Reports he had to do a lot of running around this morning before coming to therapy. Getting marium horses in the calf. Gets throbbing in the top of the foot at night and has trouble sleeping.       OBJECTIVE: See eval   Observation:    Test measurements:      RESTRICTIONS/PRECAUTIONS: WBAT transition out of Boot     Exercises/Interventions:   Therapeutic Ex (54364)  Therapeutic Activity (20586)  NMR re-education (60799) Sets/Reps improvements in LE, proximal hip, and core control with self care, mobility, lifting, ambulation. [x] (99563) Provided verbal/tactile cueing for activities related to improving balance, coordination, kinesthetic sense, posture, motor skill, proprioception to assist with LE, proximal hip, and core control in self-care, mobility, lifting, ambulation and eccentric single leg control. NMR and Therapeutic Activities:    [x] (60081 or 00482) Provided verbal/tactile cueing for activities related to improving balance, coordination, kinesthetic sense, posture, motor skill, proprioception and motor activation to allow for proper function of core, proximal hip and LE with self-care and ADLs and functional mobility. [x] (92775) Gait Re-education- Provided training and instruction to the patient for proper LE, core and proximal hip recruitment and positioning and eccentric body weight control with ambulation re-education including up and down stairs     Home Exercise Program:    [x] (10143) Reviewed/Progressed HEP activities related to strengthening, flexibility, endurance, ROM of core, proximal hip and LE for functional self-care, mobility, lifting and ambulation/stair navigation   [x] (14147) Reviewed/Progressed HEP activities related to improving balance, coordination, kinesthetic sense, posture, motor skill, proprioception of core, proximal hip and LE for self-care, mobility, lifting, and ambulation/stair navigation      Manual Treatments:  PROM / STM / Oscillations-Mobs:  G-I, II, III, IV (PA's, Inf., Post.)  [x] (25558) Provided manual therapy to mobilize LE, proximal hip and/or LS spine soft tissue/joints for the purpose of modulating pain, promoting relaxation, increasing ROM, reducing/eliminating soft tissue swelling/inflammation/restriction, improving soft tissue extensibility and allowing for proper ROM for normal function with self-care, mobility, lifting and ambulation.      Modalities:  Vaso 10'    Charges:  Timed Code Treatment Minutes: 54'   Total Treatment Minutes:  72'   [facility]:  [facility] TIME:  MANUAL TIME:  UNTIMED MINUTES:  Medicare Total:  10:30-11:35  10:30-10:L55  10:55-11:10  11:10-11:25  11:25:11:35    -      [] EVAL (LOW) 83298 (typically 20 minutes face-to-face)  [] EVAL (MOD) 40897 (typically 30 minutes face-to-face)  [] EVAL (HIGH) 15746 (typically 45 minutes face-to-face)  [] RE-EVAL     [x] JI(72943) x  2   [] IONTO  [x] NMR (73295) x 1    [] VASO  [x] Manual (60089) x  1   [] Other:  [] TA x      [] Mech Traction (02816)  [] ES(attended) (53852)      [] ES (un) (91646):    ASSESSMENT:  See eval    GOALS:   Short Term Goals: To be achieved in: 2 weeks  1. Independent in HEP and progression per patient tolerance, in order to prevent re-injury. [x]? Progressing: []? Met: []? Not Met: []? Adjusted       2. Patient will have a decrease in pain to facilitate improvement in movement, function, and ADLs as indicated by Functional Deficits. [x]? Progressing: []? Met: []? Not Met: []? Adjusted          Long Term Goals: To be achieved in: 12 weeks  1. Disability index score of 20% or less for the LEFS to assist with reaching prior level of function. [x]? Progressing: []? Met: []? Not Met: []? Adjusted      2. Patient will demonstrate increased AROM to equal the opposite side bilaterally to allow for proper joint functioning as indicated by patients Functional Deficits. [x]? Progressing: []? Met: []? Not Met: []? Adjusted       3. Patient will demonstrate an increase in strength of R Ankle  = L Ankle to allow for proper functional mobility as indicated by patients Functional Deficits. [x]? Progressing: []? Met: []? Not Met: []? Adjusted       4. Patient will return to all transfers, work activities, and functional activities without increased symptoms or restriction. [x]? Progressing: []? Met: []? Not Met: []? Adjusted       5. Patient will have 0/10 pain with ADL's. [x]?

## 2021-03-18 ENCOUNTER — HOSPITAL ENCOUNTER (OUTPATIENT)
Dept: PHYSICAL THERAPY | Age: 56
Setting detail: THERAPIES SERIES
Discharge: HOME | End: 2021-03-18
Payer: COMMERCIAL

## 2021-03-18 PROCEDURE — 97140 MANUAL THERAPY 1/> REGIONS: CPT

## 2021-03-18 PROCEDURE — 97110 THERAPEUTIC EXERCISES: CPT

## 2021-03-18 PROCEDURE — 97112 NEUROMUSCULAR REEDUCATION: CPT

## 2021-03-18 NOTE — FLOWSHEET NOTE
[facility], [address] [place] [address] [address], [address]  Phone: [phone], [phone]    Physical Therapy Treatment Note/ Progress Report:     Date:  3/18/2021    Patient Name:  Maximo Skelton    :  1965  MRN: [id number]  Restrictions/Precautions:    Medical/Treatment Diagnosis Information:  · Diagnosis: Closed Non-Displaced Fx 5th Metatarsal R Foot  · Treatment Diagnosis: F76.516C  Insurance/Certification information:  PT Insurance Information: [facility]  Physician Information:  Referring Practitioner: Kristy Sharif  Has the plan of care been signed (Y/N):        []  Yes  [x]  No     Date of Patient follow up with Physician:     Is this a Progress Report:     []  Yes  [x]  No      If Yes:  Date Range for reporting period:  Initial Eval: 3/2/2021  Beginning: 3/2/2021 --- Endin2021    Progress report will be due (10 Rx or 30 days whichever is less): 3930     Recertification will be due (POC Duration  / 90 days whichever is less): 2021      Visit # Insurance Allowable Auth Required   In Person 4 12 visits   (2021-2021) []  Yes     []  No    Tele Health -  []  Yes     []  No    Total 4       Functional Scale: LEFS: 75% (Score: 20/80)   Date assessed: 3/2/2021      Latex Allergy:  [x]NO      []YES  Preferred Language for Healthcare:   [x]English       []other:    Pain level:  8/10     SUBJECTIVE:  Patient enters clinic in boot today, states no matter what he does swelling and stiffness won't go away. OBJECTIVE: See eval   Observation:    Test measurements:      RESTRICTIONS/PRECAUTIONS: WBAT transition out of Boot     Exercises/Interventions:   Therapeutic Ex (57244)  Therapeutic Activity (60243)  NMR re-education (16257) Sets/Reps Notes/CUES   Bike 5'          Ankle AROM 20 x ea ??, ??    Ankle Circles 20 x  CW, CCW        Long Sit HS Stretch 3 x 30\"    Long Sit Gastroc Stretch / Soleus stretch  3 x 30\" ea              Toe Towel Curls 2'    Towel ER/IR 20 x ea Seated 1/2 moon DF / PF  2'    Seated 1/2 moon INV/EVR 2'    Seated HR  x30                   LAQ with DF / SAQ  2# x20 3\" hold         T-Band DF/PF  INV/EVR Red x20 ea               Weight Shifts  15 x ea ??, ?? Tandem Squat  X15          Slant Board 66 3x30\" ea gastroc and soleus                                   Manual Intervention (01452)     Joint Mobs 10'    PROM  10'                             [address] access code:   Access Code: [email or web address]   URL: [email or web address]   Date: 03/02/2021   Prepared by: Teresita Flannery     Exercises   Seated Ankle Alphabet - 10 reps - 3 sets - 1x daily - 7x weekly   Seated Toe Raise - 10 reps - 3 sets - 1x daily - 7x weekly   Seated Heel Raise - 10 reps - 3 sets - 1x daily - 7x weekly   Supine Ankle Dorsiflexion and Plantarflexion AROM - 10 reps - 3 sets - 1x daily - 7x weekly   Supine Ankle Inversion AROM - 10 reps - 3 sets - 1x daily - 7x weekly   Supine Ankle Eversion AROM - 10 reps - 3 sets - 1x daily - 7x weekly   Seated Toe Curl - 10 reps - 3 sets - 1x daily - 7x weekly   Seated Long Arc Quad - 10 reps - 3 sets - 1x daily - 7x weekly   Long Sitting Calf Stretch with Strap - 5 reps - 1 sets - 30 hold - 1x daily - 7x weekly   Seated Table Hamstring Stretch - 5 reps - 1 sets - 30 hold - 1x daily - 7x weekly   Seated Ankle Dorsiflexion Stretch - 5 reps - 1 sets - 30 hold - 1x daily - 7x weekly   Ankle Inversion Eversion Towel Slide - 10 reps - 3 sets - 1x daily - 7x weekly   Seated Self Great Toe Stretch - 10 reps - 3 sets - 1x daily - 7x weekly                Patient Education         Therapeutic Exercise and NMR EXR  [x] (43025) Provided verbal/tactile cueing for activities related to strengthening, flexibility, endurance, ROM for improvements in LE, proximal hip, and core control with self care, mobility, lifting, ambulation.   [x] (99567) Provided verbal/tactile cueing for activities related to improving balance, coordination, kinesthetic sense, posture, motor skill, proprioception to assist with LE, proximal hip, and core control in self-care, mobility, lifting, ambulation and eccentric single leg control. NMR and Therapeutic Activities:    [x] (39515 or 48478) Provided verbal/tactile cueing for activities related to improving balance, coordination, kinesthetic sense, posture, motor skill, proprioception and motor activation to allow for proper function of core, proximal hip and LE with self-care and ADLs and functional mobility. [x] (17840) Gait Re-education- Provided training and instruction to the patient for proper LE, core and proximal hip recruitment and positioning and eccentric body weight control with ambulation re-education including up and down stairs     Home Exercise Program:    [x] (83987) Reviewed/Progressed HEP activities related to strengthening, flexibility, endurance, ROM of core, proximal hip and LE for functional self-care, mobility, lifting and ambulation/stair navigation   [x] (72450) Reviewed/Progressed HEP activities related to improving balance, coordination, kinesthetic sense, posture, motor skill, proprioception of core, proximal hip and LE for self-care, mobility, lifting, and ambulation/stair navigation      Manual Treatments:  PROM / STM / Oscillations-Mobs:  G-I, II, III, IV (PA's, Inf., Post.)  [x] (55057) Provided manual therapy to mobilize LE, proximal hip and/or LS spine soft tissue/joints for the purpose of modulating pain, promoting relaxation, increasing ROM, reducing/eliminating soft tissue swelling/inflammation/restriction, improving soft tissue extensibility and allowing for proper ROM for normal function with self-care, mobility, lifting and ambulation.      Modalities:  Vaso 10' for compression due to swelling and pain relief     Charges:  Timed Code Treatment Minutes: 54'   Total Treatment Minutes:  72'   BWC:  TE TIME:  NMR TIME:  MANUAL TIME:  UNTIMED MINUTES:  Medicare Total:

## 2021-03-22 ENCOUNTER — HOSPITAL ENCOUNTER (OUTPATIENT)
Dept: PHYSICAL THERAPY | Age: 56
Setting detail: THERAPIES SERIES
Discharge: HOME | End: 2021-03-22
Payer: COMMERCIAL

## 2021-03-22 PROCEDURE — 97016 VASOPNEUMATIC DEVICE THERAPY: CPT | Performed by: PHYSICAL THERAPY ASSISTANT

## 2021-03-22 PROCEDURE — 97112 NEUROMUSCULAR REEDUCATION: CPT | Performed by: PHYSICAL THERAPY ASSISTANT

## 2021-03-22 PROCEDURE — 97110 THERAPEUTIC EXERCISES: CPT | Performed by: PHYSICAL THERAPY ASSISTANT

## 2021-03-22 NOTE — FLOWSHEET NOTE
[facility], [address] [place], [address]  Phone: [phone], [phone]    Physical Therapy Treatment Note/ Progress Report:     Date:  3/22/2021    Patient Name:  Jonh Christensen    :  1965  MRN: [id number]  Restrictions/Precautions:    Medical/Treatment Diagnosis Information:  · Diagnosis: Closed Non-Displaced Fx 5th Metatarsal R Foot  · Treatment Diagnosis: C00.825H  Insurance/Certification information:  PT Insurance Information: [facility]  Physician Information:  Referring Practitioner: Emilee Pratt  Has the plan of care been signed (Y/N):        []  Yes  [x]  No     Date of Patient follow up with Physician:     Is this a Progress Report:     []  Yes  [x]  No      If Yes:  Date Range for reporting period:  Initial Eval: 3/2/2021  Beginning: 3/2/2021 --- Endin2021    Progress report will be due (10 Rx or 30 days whichever is less): 661     Recertification will be due (POC Duration  / 90 days whichever is less): 2021      Visit # Insurance Allowable Auth Required   In Person 5 12 visits   (2021-2021) []  Yes     []  No    Tele Health -  []  Yes     []  No    Total 5       Functional Scale: LEFS: 75% (Score: 20/80)   Date assessed: 3/2/2021      Latex Allergy:  [x]NO      []YES  Preferred Language for Healthcare:   [x]English       []other:    Pain level:  10/10     SUBJECTIVE: States it is not good. Used ice and [place]. States he did a lot of driving and used an ankle. Upon asking how much driving he states he drove the entire weekend. HEP is \"so so\". States he gets a lot of cracking and popping. Presents in gym shoe and brace. OBJECTIVE: See eval   Observation:    Test measurements:      RESTRICTIONS/PRECAUTIONS: WBAT transition out of Boot     Exercises/Interventions:   Therapeutic Ex (25161)  Therapeutic Activity (50756)  NMR re-education (75000) Sets/Reps Notes/CUES   Bike 5'          Ankle AROM ??, ??    Ankle Circles CW, CCW Long Sit HS Stretch 3 x 30\"    Long Sit Gastroc Stretch / Soleus stretch  3 x 30\" ea              Toe Towel Curls 2'    Towel ER/IR 20 x ea    Seated 1/2 moon DF / PF  2'    Seated 1/2 moon INV/EVR 2'    Seated HR  x30                   LAQ with DF / SAQ  2# x20 3\" hold         T-Band DF/PF  INV/EVR Red x20 ea               Weight Shifts  15 x ea ??, ?? Tandem Squat  X15          Slant Board  3x30\" ea gastroc and soleus          Leg Press  80# x30    FSU  6\" x20                    Manual Intervention (21794)     Joint Mobs 5'    PROM  5'                             [address] access code:   Access Code: [email or web address]   URL: [email or web address]   Date: 03/02/2021   Prepared by: Arvel Nissen     Exercises   Seated Ankle Alphabet - 10 reps - 3 sets - 1x daily - 7x weekly   Seated Toe Raise - 10 reps - 3 sets - 1x daily - 7x weekly   Seated Heel Raise - 10 reps - 3 sets - 1x daily - 7x weekly   Supine Ankle Dorsiflexion and Plantarflexion AROM - 10 reps - 3 sets - 1x daily - 7x weekly   Supine Ankle Inversion AROM - 10 reps - 3 sets - 1x daily - 7x weekly   Supine Ankle Eversion AROM - 10 reps - 3 sets - 1x daily - 7x weekly   Seated Toe Curl - 10 reps - 3 sets - 1x daily - 7x weekly   Seated Long Arc Quad - 10 reps - 3 sets - 1x daily - 7x weekly   Long Sitting Calf Stretch with Strap - 5 reps - 1 sets - 30 hold - 1x daily - 7x weekly   Seated Table Hamstring Stretch - 5 reps - 1 sets - 30 hold - 1x daily - 7x weekly   Seated Ankle Dorsiflexion Stretch - 5 reps - 1 sets - 30 hold - 1x daily - 7x weekly   Ankle Inversion Eversion Towel Slide - 10 reps - 3 sets - 1x daily - 7x weekly   Seated Self Great Toe Stretch - 10 reps - 3 sets - 1x daily - 7x weekly                Patient Education         Therapeutic Exercise and NMR EXR  [x] (40510) Provided verbal/tactile cueing for activities related to strengthening, flexibility, endurance, ROM for improvements in LE, proximal hip, and core control with self care, mobility, lifting, ambulation. [x] (65273) Provided verbal/tactile cueing for activities related to improving balance, coordination, kinesthetic sense, posture, motor skill, proprioception to assist with LE, proximal hip, and core control in self-care, mobility, lifting, ambulation and eccentric single leg control. NMR and Therapeutic Activities:    [x] (43315 or 63892) Provided verbal/tactile cueing for activities related to improving balance, coordination, kinesthetic sense, posture, motor skill, proprioception and motor activation to allow for proper function of core, proximal hip and LE with self-care and ADLs and functional mobility. [x] (32698) Gait Re-education- Provided training and instruction to the patient for proper LE, core and proximal hip recruitment and positioning and eccentric body weight control with ambulation re-education including up and down stairs     Home Exercise Program:    [x] (60108) Reviewed/Progressed HEP activities related to strengthening, flexibility, endurance, ROM of core, proximal hip and LE for functional self-care, mobility, lifting and ambulation/stair navigation   [x] (79152) Reviewed/Progressed HEP activities related to improving balance, coordination, kinesthetic sense, posture, motor skill, proprioception of core, proximal hip and LE for self-care, mobility, lifting, and ambulation/stair navigation      Manual Treatments:  PROM / STM / Oscillations-Mobs:  G-I, II, III, IV (PA's, Inf., Post.)  [x] (66788) Provided manual therapy to mobilize LE, proximal hip and/or LS spine soft tissue/joints for the purpose of modulating pain, promoting relaxation, increasing ROM, reducing/eliminating soft tissue swelling/inflammation/restriction, improving soft tissue extensibility and allowing for proper ROM for normal function with self-care, mobility, lifting and ambulation.      Modalities:  Vaso 10' for compression due to swelling and pain relief     Charges:  Timed Code 6. Patient stated goal:To be able to walk normal without boot and decrease pain   [x]? Progressing: []? Met: []? Not Met: []? Adjusted                        Overall Progression Towards Functional goals/ Treatment Progress Update:  [x] Patient is progressing as expected towards functional goals listed. [] Progression is slowed due to complexities/Impairments listed. [] Progression has been slowed due to co-morbidities. [] Plan just implemented, too soon to assess goals progression <30days   [] Goals require adjustment due to lack of progress  [] Patient is not progressing as expected and requires additional follow up with physician  [] Other    Prognosis for POC: [x] Good [] Fair  [] Poor    Patient requires continued skilled intervention: [x] Yes  [] No    Treatment/Activity Tolerance:  [x] Patient able to complete treatment  [] Patient limited by fatigue  [] Patient limited by pain    [] Patient limited by other medical complications  [] Other:     Return to Play: (if applicable)   []  Stage 1: Intro to Strength   []  Stage 2: Return to Run and Strength   []  Stage 3: Return to Jump and Strength   []  Stage 4: Dynamic Strength and Agility   []  Stage 5: Sport Specific Training     []  Ready to Return to Play, Meets All Above Stages   []  Not Ready for Return to Sports   Comments:                         PLAN: See eval  [x] Continue per plan of care [] Alter current plan (see comments above)  [] Plan of care initiated [] Hold pending MD visit [] Discharge    Electronically signed by:  Loly Serrano PTA    Note: If patient does not return for scheduled/ recommended follow up visits, this note will serve as a discharge from care along with most recent update on progress.

## 2021-03-25 ENCOUNTER — HOSPITAL ENCOUNTER (OUTPATIENT)
Dept: PHYSICAL THERAPY | Age: 56
Setting detail: THERAPIES SERIES
Discharge: HOME | End: 2021-03-25
Payer: COMMERCIAL

## 2021-03-25 PROCEDURE — 97112 NEUROMUSCULAR REEDUCATION: CPT

## 2021-03-25 PROCEDURE — 97140 MANUAL THERAPY 1/> REGIONS: CPT

## 2021-03-25 PROCEDURE — 97110 THERAPEUTIC EXERCISES: CPT

## 2021-03-25 NOTE — FLOWSHEET NOTE
[facility], [address] [place] [address], [address]  Phone: [phone], [phone]    Physical Therapy Treatment Note/ Progress Report:     Date:  3/25/2021    Patient Name:  Rashawn Lane    :  1965  MRN: [id number]  Restrictions/Precautions:    Medical/Treatment Diagnosis Information:  · Diagnosis: Closed Non-Displaced Fx 5th Metatarsal R Foot  · Treatment Diagnosis: G83.824O  Insurance/Certification information:  PT Insurance Information: [employer]  Physician Information:  Referring Practitioner: Jose Orozco  Has the plan of care been signed (Y/N):        []  Yes  [x]  No     Date of Patient follow up with Physician:     Is this a Progress Report:     []  Yes  [x]  No      If Yes:  Date Range for reporting period:  Initial Eval: 3/2/2021  Beginning: 3/2/2021 --- Endin2021    Progress report will be due (10 Rx or 30 days whichever is less): 2412     Recertification will be due (POC Duration  / 90 days whichever is less): 2021      Visit # Insurance Allowable Auth Required   In Person 6 12 visits   (2021-2021) []  Yes     []  No    Tele Health -  []  Yes     []  No    Total 6       Functional Scale: LEFS: 75% (Score: 20/80)   Date assessed: 3/2/2021      Latex Allergy:  [x]NO      []YES  Preferred Language for Healthcare:   [x]English       []other:    Pain level:  10/10     SUBJECTIVE: Patient reports pain on the bottom of foot and up into the achilles tendon, states his left knee is really hurting today.      OBJECTIVE: See eval   Observation:    Test measurements:      RESTRICTIONS/PRECAUTIONS: WBAT transition out of Boot     Exercises/Interventions:   Therapeutic Ex (04792)  Therapeutic Activity (06863)  NMR re-education (90718) Sets/Reps Notes/CUES   Bike 5'          ??, ??   CW, CCW        Long Sit HS Stretch 3 x 30\"    Long Sit Gastroc Stretch / Soleus stretch  3 x 30\" ea              Toe Towel Curls 2'    Towel ER/IR 20 x ea    Seated 1/2 moon DF / PF skill, proprioception to assist with LE, proximal hip, and core control in self-care, mobility, lifting, ambulation and eccentric single leg control. NMR and Therapeutic Activities:    [x] (10923 or 32438) Provided verbal/tactile cueing for activities related to improving balance, coordination, kinesthetic sense, posture, motor skill, proprioception and motor activation to allow for proper function of core, proximal hip and LE with self-care and ADLs and functional mobility. [x] (78160) Gait Re-education- Provided training and instruction to the patient for proper LE, core and proximal hip recruitment and positioning and eccentric body weight control with ambulation re-education including up and down stairs     Home Exercise Program:    [x] (02108) Reviewed/Progressed HEP activities related to strengthening, flexibility, endurance, ROM of core, proximal hip and LE for functional self-care, mobility, lifting and ambulation/stair navigation   [x] (16160) Reviewed/Progressed HEP activities related to improving balance, coordination, kinesthetic sense, posture, motor skill, proprioception of core, proximal hip and LE for self-care, mobility, lifting, and ambulation/stair navigation      Manual Treatments:  PROM / STM / Oscillations-Mobs:  G-I, II, III, IV (PA's, Inf., Post.)  [x] (73078) Provided manual therapy to mobilize LE, proximal hip and/or LS spine soft tissue/joints for the purpose of modulating pain, promoting relaxation, increasing ROM, reducing/eliminating soft tissue swelling/inflammation/restriction, improving soft tissue extensibility and allowing for proper ROM for normal function with self-care, mobility, lifting and ambulation.      Modalities:  Vaso 10' for compression due to swelling and pain relief     Charges:  Timed Code Treatment Minutes: 60'   Total Treatment Minutes:  60'   BWC:  TE TIME:  NMR TIME:  MANUAL TIME:  UNTIMED MINUTES:  Medicare Total: 10:30-11:30  10:30-11:00  11:00-11:15  11:15-11:30  -  -      [] EVAL (LOW) 01992 (typically 20 minutes face-to-face)  [] EVAL (MOD) 76850 (typically 30 minutes face-to-face)  [] EVAL (HIGH) 22933 (typically 45 minutes face-to-face)  [] RE-EVAL     [x] MB(69021) x  2   [] IONTO  [x] NMR (36707) x 1    [] VASO  [x] Manual (51848) x 1    [] Other:  [] TA x      [] Mech Traction (20022)  [] ES(attended) (93593)      [] ES (un) (39489):    ASSESSMENT:  See eval    GOALS:   Short Term Goals: To be achieved in: 2 weeks  1. Independent in HEP and progression per patient tolerance, in order to prevent re-injury. [x]? Progressing: []? Met: []? Not Met: []? Adjusted       2. Patient will have a decrease in pain to facilitate improvement in movement, function, and ADLs as indicated by Functional Deficits. [x]? Progressing: []? Met: []? Not Met: []? Adjusted          Long Term Goals: To be achieved in: 12 weeks  1. Disability index score of 20% or less for the LEFS to assist with reaching prior level of function. [x]? Progressing: []? Met: []? Not Met: []? Adjusted      2. Patient will demonstrate increased AROM to equal the opposite side bilaterally to allow for proper joint functioning as indicated by patients Functional Deficits. [x]? Progressing: []? Met: []? Not Met: []? Adjusted       3. Patient will demonstrate an increase in strength of R Ankle  = L Ankle to allow for proper functional mobility as indicated by patients Functional Deficits. [x]? Progressing: []? Met: []? Not Met: []? Adjusted       4. Patient will return to all transfers, work activities, and functional activities without increased symptoms or restriction. [x]? Progressing: []? Met: []? Not Met: []? Adjusted       5. Patient will have 0/10 pain with ADL's. [x]? Progressing: []? Met: []? Not Met: []? Adjusted       6. Patient stated goal:To be able to walk normal without boot and decrease pain   [x]? Progressing: []? Met: []?  Not Met: []?

## 2021-03-29 ENCOUNTER — HOSPITAL ENCOUNTER (OUTPATIENT)
Dept: PHYSICAL THERAPY | Age: 56
Setting detail: THERAPIES SERIES
Discharge: HOME | End: 2021-03-29
Payer: COMMERCIAL

## 2021-03-29 PROCEDURE — 97110 THERAPEUTIC EXERCISES: CPT | Performed by: PHYSICAL THERAPY ASSISTANT

## 2021-03-29 PROCEDURE — 97112 NEUROMUSCULAR REEDUCATION: CPT | Performed by: PHYSICAL THERAPY ASSISTANT

## 2021-03-29 PROCEDURE — 97140 MANUAL THERAPY 1/> REGIONS: CPT | Performed by: PHYSICAL THERAPY ASSISTANT

## 2021-03-29 NOTE — FLOWSHEET NOTE
??   CW, CCW        Long Sit HS Stretch 3 x 30\"    Long Sit Gastroc Stretch / Soleus stretch  3 x 30\" ea              Toe Towel Curls 2'    Towel ER/IR 20 x ea    Seated 1/2 moon DF / PF  2'    Seated 1/2 moon INV/EVR 2'    Seated HR  x30                   LAQ with DF / SAQ  2# x20 3\" hold         T-Band DF/PF  INV/EVR Green x20 ea               Weight Shifts  15 x ea ??, ?? Tandem Squat  X15          Slant Board  3x30\" ea gastroc and soleus          Leg Press  80# x30 / 60# x20 R, L     FSU  6\" x 20     Lateral step up and over  6\" x20     SLB 10x10\"         Manual Intervention (15206)     Joint Mobs 5'    PROM  5'                             [address] access code:   Access Code: [email or web address]   URL: [email or web address]   Date: 03/02/2021   Prepared by: Ritu Dumont     Exercises   Seated Ankle Alphabet - 10 reps - 3 sets - 1x daily - 7x weekly   Seated Toe Raise - 10 reps - 3 sets - 1x daily - 7x weekly   Seated Heel Raise - 10 reps - 3 sets - 1x daily - 7x weekly   Supine Ankle Dorsiflexion and Plantarflexion AROM - 10 reps - 3 sets - 1x daily - 7x weekly   Supine Ankle Inversion AROM - 10 reps - 3 sets - 1x daily - 7x weekly   Supine Ankle Eversion AROM - 10 reps - 3 sets - 1x daily - 7x weekly   Seated Toe Curl - 10 reps - 3 sets - 1x daily - 7x weekly   Seated Long Arc Quad - 10 reps - 3 sets - 1x daily - 7x weekly   Long Sitting Calf Stretch with Strap - 5 reps - 1 sets - 30 hold - 1x daily - 7x weekly   Seated Table Hamstring Stretch - 5 reps - 1 sets - 30 hold - 1x daily - 7x weekly   Seated Ankle Dorsiflexion Stretch - 5 reps - 1 sets - 30 hold - 1x daily - 7x weekly   Ankle Inversion Eversion Towel Slide - 10 reps - 3 sets - 1x daily - 7x weekly   Seated Self Great Toe Stretch - 10 reps - 3 sets - 1x daily - 7x weekly                Patient Education         Therapeutic Exercise and NMR EXR  [x] (86755) Provided verbal/tactile cueing for activities related to strengthening, flexibility, endurance, ROM for improvements in LE, proximal hip, and core control with self care, mobility, lifting, ambulation. [x] (14499) Provided verbal/tactile cueing for activities related to improving balance, coordination, kinesthetic sense, posture, motor skill, proprioception to assist with LE, proximal hip, and core control in self-care, mobility, lifting, ambulation and eccentric single leg control. NMR and Therapeutic Activities:    [x] (40552 or 96354) Provided verbal/tactile cueing for activities related to improving balance, coordination, kinesthetic sense, posture, motor skill, proprioception and motor activation to allow for proper function of core, proximal hip and LE with self-care and ADLs and functional mobility. [x] (66718) Gait Re-education- Provided training and instruction to the patient for proper LE, core and proximal hip recruitment and positioning and eccentric body weight control with ambulation re-education including up and down stairs     Home Exercise Program:    [x] (40753) Reviewed/Progressed HEP activities related to strengthening, flexibility, endurance, ROM of core, proximal hip and LE for functional self-care, mobility, lifting and ambulation/stair navigation   [x] (56833) Reviewed/Progressed HEP activities related to improving balance, coordination, kinesthetic sense, posture, motor skill, proprioception of core, proximal hip and LE for self-care, mobility, lifting, and ambulation/stair navigation      Manual Treatments:  PROM / STM / Oscillations-Mobs:  G-I, II, III, IV (PA's, Inf., Post.)  [x] (35067) Provided manual therapy to mobilize LE, proximal hip and/or LS spine soft tissue/joints for the purpose of modulating pain, promoting relaxation, increasing ROM, reducing/eliminating soft tissue swelling/inflammation/restriction, improving soft tissue extensibility and allowing for proper ROM for normal function with self-care, mobility, lifting and ambulation.      Modalities: Patient will have 0/10 pain with ADL's. [x]? Progressing: []? Met: []? Not Met: []? Adjusted       6. Patient stated goal:To be able to walk normal without boot and decrease pain   [x]? Progressing: []? Met: []? Not Met: []? Adjusted                        Overall Progression Towards Functional goals/ Treatment Progress Update:  [x] Patient is progressing as expected towards functional goals listed. [] Progression is slowed due to complexities/Impairments listed. [] Progression has been slowed due to co-morbidities. [] Plan just implemented, too soon to assess goals progression <30days   [] Goals require adjustment due to lack of progress  [] Patient is not progressing as expected and requires additional follow up with physician  [] Other    Prognosis for POC: [x] Good [] Fair  [] Poor    Patient requires continued skilled intervention: [x] Yes  [] No    Treatment/Activity Tolerance:  [x] Patient able to complete treatment  [] Patient limited by fatigue  [] Patient limited by pain    [] Patient limited by other medical complications  [] Other:     Return to Play: (if applicable)   []  Stage 1: Intro to Strength   []  Stage 2: Return to Run and Strength   []  Stage 3: Return to Jump and Strength   []  Stage 4: Dynamic Strength and Agility   []  Stage 5: Sport Specific Training     []  Ready to Return to Play, Meets All Above Stages   []  Not Ready for Return to Sports   Comments:                         PLAN: See eval  [x] Continue per plan of care [] Alter current plan (see comments above)  [] Plan of care initiated [] Hold pending MD visit [] Discharge    Electronically signed by:  [email or web address] Camejo PTA    Note: If patient does not return for scheduled/ recommended follow up visits, this note will serve as a discharge from care along with most recent update on progress.

## 2021-03-31 ENCOUNTER — APPOINTMENT (OUTPATIENT)
Dept: PHYSICAL THERAPY | Age: 56
End: 2021-03-31
Payer: COMMERCIAL

## 2021-04-05 ENCOUNTER — HOSPITAL ENCOUNTER (OUTPATIENT)
Dept: PHYSICAL THERAPY | Age: 56
Setting detail: THERAPIES SERIES
Discharge: HOME | End: 2021-04-05
Payer: COMMERCIAL

## 2021-04-05 PROCEDURE — 97016 VASOPNEUMATIC DEVICE THERAPY: CPT | Performed by: PHYSICAL THERAPY ASSISTANT

## 2021-04-05 PROCEDURE — 97110 THERAPEUTIC EXERCISES: CPT | Performed by: PHYSICAL THERAPY ASSISTANT

## 2021-04-05 PROCEDURE — 97140 MANUAL THERAPY 1/> REGIONS: CPT | Performed by: PHYSICAL THERAPY ASSISTANT

## 2021-04-05 PROCEDURE — 97112 NEUROMUSCULAR REEDUCATION: CPT | Performed by: PHYSICAL THERAPY ASSISTANT

## 2021-04-05 NOTE — FLOWSHEET NOTE
Formerly [facility], later [facility],  [facility] [address], [address]  Phone: [phone], [phone]    Physical Therapy Treatment Note/ Progress Report:     Date:  2021    Patient Name:  Alicia Barrera    :  1965  MRN: [id number]  Restrictions/Precautions:    Medical/Treatment Diagnosis Information:  · Diagnosis: Closed Non-Displaced Fx 5th Metatarsal R Foot  · Treatment Diagnosis: G58.129N  Insurance/Certification information:  PT Insurance Information: [facility]  Physician Information:  Referring Practitioner: Gina Da Silva  Has the plan of care been signed (Y/N):        []  Yes  [x]  No     Date of Patient follow up with Physician:     Is this a Progress Report:     []  Yes  [x]  No      If Yes:  Date Range for reporting period:  Initial Eval: 3/2/2021  Beginning: 3/2/2021 --- Endin2021    Progress report will be due (10 Rx or 30 days whichever is less): 5/3/6359     Recertification will be due (POC Duration  / 90 days whichever is less): 2021      Visit # Insurance Allowable Auth Required   In Person 8 12 visits   (2021-2021) []  Yes     []  No    Tele Health -  []  Yes     []  No    Total 8       Functional Scale: LEFS: 75% (Score: 20/80)   Date assessed: 3/2/2021      Latex Allergy:  [x]NO      []YES  Preferred Language for Healthcare:   [x]English       []other:    Pain level:  10/10     SUBJECTIVE: Continues to have increased swelling and pain in the ankle. He is concerned there is something else going on. He notes that where his fracture was is fine and feels good but he has significant pain and locking in the ankle itself. He had a fall on Saturday b/c his ankle locked and he went down. He notes that he is having trouble sleeping b/c of pain and his BP has been high secondary to pain. He plans to call MD on his way home to discuss the ankle.       OBJECTIVE: See eval   Observation:    Test measurements:      RESTRICTIONS/PRECAUTIONS: WBAT transition out of [employer] Exercises/Interventions:   Therapeutic Ex (23701)  Therapeutic Activity (87081)  NMR re-education (24313) Sets/Reps Notes/CUES   Bike 5'          ??, ??   CW, CCW        Long Sit HS Stretch 3 x 30\"    Long Sit Gastroc Stretch / Soleus stretch  3 x 30\" ea              Toe Towel Curls 2'    Towel ER/IR 20 x ea    Seated 1/2 moon DF / PF  2'    Seated 1/2 moon INV/EVR 2'    Seated HR  x30                   LAQ with DF / SAQ  2# x20 3\" hold         T-Band DF/PF  INV/EVR Green x20 ea               Weight Shifts  15 x ea ??, ?? Tandem Squat  X15     Mini Squat  X20    Slant Board  3x30\" ea gastroc and soleus          Leg Press   Held secondary to pain   FSU  6\" x 20     Lateral step up and over  6\" x20     SLB  \"        Manual Intervention (73164)     Joint Mobs 5'    PROM  5'                             [address] access code:   Access Code: [email or web address]   URL: [email or web address]   Date: 03/02/2021   Prepared by: Zen Oh     Exercises   Seated Ankle Alphabet - 10 reps - 3 sets - 1x daily - 7x weekly   Seated Toe Raise - 10 reps - 3 sets - 1x daily - 7x weekly   Seated Heel Raise - 10 reps - 3 sets - 1x daily - 7x weekly   Supine Ankle Dorsiflexion and Plantarflexion AROM - 10 reps - 3 sets - 1x daily - 7x weekly   Supine Ankle Inversion AROM - 10 reps - 3 sets - 1x daily - 7x weekly   Supine Ankle Eversion AROM - 10 reps - 3 sets - 1x daily - 7x weekly   Seated Toe Curl - 10 reps - 3 sets - 1x daily - 7x weekly   Seated Long Arc Quad - 10 reps - 3 sets - 1x daily - 7x weekly   Long Sitting Calf Stretch with Strap - 5 reps - 1 sets - 30 hold - 1x daily - 7x weekly   Seated Table Hamstring Stretch - 5 reps - 1 sets - 30 hold - 1x daily - 7x weekly   Seated Ankle Dorsiflexion Stretch - 5 reps - 1 sets - 30 hold - 1x daily - 7x weekly   Ankle Inversion Eversion Towel Slide - 10 reps - 3 sets - 1x daily - 7x weekly   Seated Self Great Toe Stretch - 10 reps - 3 sets - 1x daily - 7x weekly Patient Education         Therapeutic Exercise and NMR EXR  [x] (08660) Provided verbal/tactile cueing for activities related to strengthening, flexibility, endurance, ROM for improvements in LE, proximal hip, and core control with self care, mobility, lifting, ambulation. [x] (33117) Provided verbal/tactile cueing for activities related to improving balance, coordination, kinesthetic sense, posture, motor skill, proprioception to assist with LE, proximal hip, and core control in self-care, mobility, lifting, ambulation and eccentric single leg control. NMR and Therapeutic Activities:    [x] (74314 or 43451) Provided verbal/tactile cueing for activities related to improving balance, coordination, kinesthetic sense, posture, motor skill, proprioception and motor activation to allow for proper function of core, proximal hip and LE with self-care and ADLs and functional mobility.    [x] (94513) Gait Re-education- Provided training and instruction to the patient for proper LE, core and proximal hip recruitment and positioning and eccentric body weight control with ambulation re-education including up and down stairs     Home Exercise Program:    [x] (54337) Reviewed/Progressed HEP activities related to strengthening, flexibility, endurance, ROM of core, proximal hip and LE for functional self-care, mobility, lifting and ambulation/stair navigation   [x] (41872) Reviewed/Progressed HEP activities related to improving balance, coordination, kinesthetic sense, posture, motor skill, proprioception of core, proximal hip and LE for self-care, mobility, lifting, and ambulation/stair navigation      Manual Treatments:  PROM / STM / Oscillations-Mobs:  G-I, II, III, IV (PA's, Inf., Post.)  [x] (19806) Provided manual therapy to mobilize LE, proximal hip and/or LS spine soft tissue/joints for the purpose of modulating pain, promoting relaxation, increasing ROM, reducing/eliminating soft tissue swelling/inflammation/restriction, improving soft tissue extensibility and allowing for proper ROM for normal function with self-care, mobility, lifting and ambulation. Modalities:  Vaso 10' for compression due to swelling and pain relief     Charges:  Timed Code Treatment Minutes: 60'   Total Treatment Minutes:  60'   [facility]:  [facility] TIME:  MANUAL TIME:  UNTIMED MINUTES:  Medicare Total:  11:10-12:10  11:10-11:30  11:30-11:45  11:45-12:00  12:-12:10  -      [] EVAL (LOW) 59609 (typically 20 minutes face-to-face)  [] EVAL (MOD) 11622 (typically 30 minutes face-to-face)  [] EVAL (HIGH) 64033 (typically 45 minutes face-to-face)  [] RE-EVAL     [x] JG(80916) x  1  [] IONTO  [x] NMR (03344) x 1    [x] VASO  [x] Manual (34169) x 1    [] Other:  [] TA x      [] Mech Traction (89946)  [] ES(attended) (16932)      [] ES (un) (60764):    ASSESSMENT:  See eval    GOALS:   Short Term Goals: To be achieved in: 2 weeks  1. Independent in HEP and progression per patient tolerance, in order to prevent re-injury. [x]? Progressing: []? Met: []? Not Met: []? Adjusted       2. Patient will have a decrease in pain to facilitate improvement in movement, function, and ADLs as indicated by Functional Deficits. [x]? Progressing: []? Met: []? Not Met: []? Adjusted          Long Term Goals: To be achieved in: 12 weeks  1. Disability index score of 20% or less for the LEFS to assist with reaching prior level of function. [x]? Progressing: []? Met: []? Not Met: []? Adjusted      2. Patient will demonstrate increased AROM to equal the opposite side bilaterally to allow for proper joint functioning as indicated by patients Functional Deficits. [x]? Progressing: []? Met: []? Not Met: []? Adjusted       3. Patient will demonstrate an increase in strength of R Ankle  = L Ankle to allow for proper functional mobility as indicated by patients Functional Deficits. [x]? Progressing: []? Met: []? Not Met: []? Adjusted       4.  Patient will return to all transfers, work activities, and functional activities without increased symptoms or restriction. [x]? Progressing: []? Met: []? Not Met: []? Adjusted       5. Patient will have 0/10 pain with ADL's. [x]? Progressing: []? Met: []? Not Met: []? Adjusted       6. Patient stated goal:To be able to walk normal without boot and decrease pain   [x]? Progressing: []? Met: []? Not Met: []? Adjusted                        Overall Progression Towards Functional goals/ Treatment Progress Update:  [x] Patient is progressing as expected towards functional goals listed. [] Progression is slowed due to complexities/Impairments listed. [] Progression has been slowed due to co-morbidities.   [] Plan just implemented, too soon to assess goals progression <30days   [] Goals require adjustment due to lack of progress  [] Patient is not progressing as expected and requires additional follow up with physician  [] Other    Prognosis for POC: [x] Good [] Fair  [] Poor    Patient requires continued skilled intervention: [x] Yes  [] No    Treatment/Activity Tolerance:  [x] Patient able to complete treatment  [] Patient limited by fatigue  [] Patient limited by pain    [] Patient limited by other medical complications  [] Other:     Return to Play: (if applicable)   []  Stage 1: Intro to Strength   []  Stage 2: Return to Run and Strength   []  Stage 3: Return to Jump and Strength   []  Stage 4: Dynamic Strength and Agility   []  Stage 5: Sport Specific Training     []  Ready to Return to Play, Meets All Above Stages   []  Not Ready for Return to Sports   Comments:                         PLAN: See eval  [x] Continue per plan of care [] Alter current plan (see comments above)  [] Plan of care initiated [] Hold pending MD visit [] Discharge    Electronically signed by:  Urvashi Keenan PTA    Note: If patient does not return for scheduled/ recommended follow up visits, this note will serve as a discharge from care along with most recent update on progress.

## 2021-04-07 ENCOUNTER — HOSPITAL ENCOUNTER (OUTPATIENT)
Dept: PHYSICAL THERAPY | Age: 56
Setting detail: THERAPIES SERIES
Discharge: HOME | End: 2021-04-07
Payer: COMMERCIAL

## 2021-04-12 ENCOUNTER — HOSPITAL ENCOUNTER (OUTPATIENT)
Dept: PHYSICAL THERAPY | Age: 56
Setting detail: THERAPIES SERIES
Discharge: HOME | End: 2021-04-12
Payer: COMMERCIAL

## 2021-04-12 PROCEDURE — 97112 NEUROMUSCULAR REEDUCATION: CPT | Performed by: PHYSICAL THERAPY ASSISTANT

## 2021-04-12 PROCEDURE — 97016 VASOPNEUMATIC DEVICE THERAPY: CPT | Performed by: PHYSICAL THERAPY ASSISTANT

## 2021-04-12 PROCEDURE — 97140 MANUAL THERAPY 1/> REGIONS: CPT | Performed by: PHYSICAL THERAPY ASSISTANT

## 2021-04-12 PROCEDURE — 97110 THERAPEUTIC EXERCISES: CPT | Performed by: PHYSICAL THERAPY ASSISTANT

## 2021-04-12 NOTE — FLOWSHEET NOTE
[facility], [address], [place], [address]  Phone: [phone], [phone]    Date: 2021          Patient Name; :  Chetan Strickland; 1965   Dx/ICD Code: Diagnosis: Closed Non-Displaced Fx 5th Metatarsal R Foot  Treatment Diagnosis: S92.354A     Physician: Dr. Matthew Collins        Total PT Visits: 9     Measures Previous Current   Pain (0-10) 10/10 3/10   Disability % LEFS:  75% LEFS:  61 = 24% (regarding metatarsal area only)   ROM Ankle PF 52 °  50 °    Ankle DF 0 °  AROM lacking 12 °   PROM Lacking 9 °   Ankle In 40 °   AROM 15 °   PROM 25 °    Ankle Ev 15 °  5       Ankle PF 52 °  Active  34 °    Ankle DF 0 °  Active 0   Ankle In 40 °   Active: 18    Ankle Ev 15 °  Active:  10                   Strength                 Specific Functional Improvements & Impressions:  Alejandro Byrne reports the metatarsal area of the foot is doing well and he has minimal complaint of pain in the foot with the exception of tingling in the 5th metatarsal area. He wants to have a conversation in regards to his ankle and the continued swelling and pain he has in that area. He also notes a sharp jabbing pain all night in the achilles area. He is primarily in his shoe at this point but intermittently wears his boot if his ankle is bothering him significantly. The boot significantly improves the ankle symptoms. He has had a few falls secondary to pain with weight bearing in the ankle. He does wear compression stockings at all times but is still battling edema in the ankle.       Plan & Recommendations:  [x] Continue rehabilitation due to objective improvement and continued functional deficits with frequency and duration: finish last 3 approved visits and then D/C for the Metatarsal diagnosis   [] Progress toward  []GAP, []Work Conditioning, []Independent HEP   [] Discharge   due to   [] All goals achieved, [] Maximized \"medical necessity\" [] No subjective or objective improvements Electronically signed by:  Deepak Perkins PTA; [facility]      Therapy Plan of Care Re-Certification  This patient has been re-evaluated for physical therapy services and for therapy to continue, Medicare, Medicaid and other insurances require periodic physician review of the treatment plan. Please review the above re-evaluation and verify that you agree with plan of care as established above by signing the attached document and return it to our office or note changes to established plan below  [] Follow treatment plan as above [] Discontinue physical therapy  [] Change plan to:                                 __________________________________________________    Physician Signature:____________________________________ Date:____________  By signing above, therapists plan is approved by physician    If you have any questions or concerns, please don't hesitate to call.   Thank you for your referral.           Physical Therapy Treatment Note/ Progress Report:     Date:  2021    Patient Name:  Efra Patterson    :  1965  MRN: [id number]  Restrictions/Precautions:    Medical/Treatment Diagnosis Information:  · Diagnosis: Closed Non-Displaced Fx 5th Metatarsal R Foot  · Treatment Diagnosis: C35.248Z  Insurance/Certification information:  PT Insurance Information: [facility]  Physician Information:  Referring Practitioner: Gen Haddad  Has the plan of care been signed (Y/N):        []  Yes  [x]  No     Date of Patient follow up with Physician:     Is this a Progress Report:     []  Yes  [x]  No      If Yes:  Date Range for reporting period:  Initial Eval: 3/2/2021  Beginning: 3/2/2021 --- Endin2021    Progress report will be due (10 Rx or 30 days whichever is less):      Recertification will be due (POC Duration  / 90 days whichever is less): 2021      Visit # Insurance Allowable Auth Required   In Person 9 12 visits   (2021-2021)  []  Yes     []  No    Tele Health -  []  Yes for proper function of core, proximal hip and LE with self-care and ADLs and functional mobility. [x] (26762) Gait Re-education- Provided training and instruction to the patient for proper LE, core and proximal hip recruitment and positioning and eccentric body weight control with ambulation re-education including up and down stairs     Home Exercise Program:    [x] (08339) Reviewed/Progressed HEP activities related to strengthening, flexibility, endurance, ROM of core, proximal hip and LE for functional self-care, mobility, lifting and ambulation/stair navigation   [x] (73062) Reviewed/Progressed HEP activities related to improving balance, coordination, kinesthetic sense, posture, motor skill, proprioception of core, proximal hip and LE for self-care, mobility, lifting, and ambulation/stair navigation      Manual Treatments:  PROM / STM / Oscillations-Mobs:  G-I, II, III, IV (PA's, Inf., Post.)  [x] (99060) Provided manual therapy to mobilize LE, proximal hip and/or LS spine soft tissue/joints for the purpose of modulating pain, promoting relaxation, increasing ROM, reducing/eliminating soft tissue swelling/inflammation/restriction, improving soft tissue extensibility and allowing for proper ROM for normal function with self-care, mobility, lifting and ambulation.      Modalities:  Vaso 10' for compression due to swelling and pain relief     Charges:  Timed Code Treatment Minutes: 60'   Total Treatment Minutes:  60'   [address] [place] [address]:  [facility] TIME:  MANUAL TIME:  UNTIMED MINUTES:  Medicare Total:  11:10-12:10  11:10-11:30  11:30-11:45  11:45-12:00  12:-12:10  -      [] EVAL (LOW) 36988 (typically 20 minutes face-to-face)  [] EVAL (MOD) 00191 (typically 30 minutes face-to-face)  [] EVAL (HIGH) 91294 (typically 45 minutes face-to-face)  [] RE-EVAL     [x] ON(30059) x  1  [] IONTO  [x] NMR (35435) x 1    [x] VASO  [x] Manual (42893) x 1    [] Other:  [] TA x      [] Mech Traction (21518)  [] ES(attended) (50617)      [] ES (un) (52903):    ASSESSMENT:  See eval    GOALS:   Short Term Goals: To be achieved in: 2 weeks  1. Independent in HEP and progression per patient tolerance, in order to prevent re-injury. []? Progressing: [x]? Met: []? Not Met: []? Adjusted       2. Patient will have a decrease in pain to facilitate improvement in movement, function, and ADLs as indicated by Functional Deficits. []? Progressing: [x]? Met: []? Not Met: []? Adjusted          Long Term Goals: To be achieved in: 12 weeks  1. Disability index score of 20% or less for the LEFS to assist with reaching prior level of function. [x]? Progressing: []? Met: []? Not Met: []? Adjusted      2. Patient will demonstrate increased AROM to equal the opposite side bilaterally to allow for proper joint functioning as indicated by patients Functional Deficits. []? Progressing: [x]? Met: []? Not Met: []? Adjusted       3. Patient will demonstrate an increase in strength of R Ankle  = L Ankle to allow for proper functional mobility as indicated by patients Functional Deficits. [x]? Progressing: []? Met: []? Not Met: []? Adjusted       4. Patient will return to all transfers, work activities, and functional activities without increased symptoms or restriction. []? Progressing: [x]? Met: []? Not Met: []? Adjusted       5. Patient will have 0/10 pain with ADL's. [x]? Progressing: []? Met: []? Not Met: []? Adjusted       6. Patient stated goal:To be able to walk normal without boot and decrease pain (MET FOR THE METATARSAL)  []? Progressing: [x]? Met: []? Not Met: []? Adjusted                        Overall Progression Towards Functional goals/ Treatment Progress Update:  [x] Patient is progressing as expected towards functional goals listed. [] Progression is slowed due to complexities/Impairments listed. [] Progression has been slowed due to co-morbidities.   [] Plan just implemented, too soon to assess goals progression <30days   [] Goals require adjustment due to lack of progress  [] Patient is not progressing as expected and requires additional follow up with physician  [] Other    Prognosis for POC: [x] Good [] Fair  [] Poor    Patient requires continued skilled intervention: [x] Yes  [] No    Treatment/Activity Tolerance:  [x] Patient able to complete treatment  [] Patient limited by fatigue  [] Patient limited by pain    [] Patient limited by other medical complications  [] Other:     Return to Play: (if applicable)   []  Stage 1: Intro to Strength   []  Stage 2: Return to Run and Strength   []  Stage 3: Return to Jump and Strength   []  Stage 4: Dynamic Strength and Agility   []  Stage 5: Sport Specific Training     []  Ready to Return to Play, Meets All Above Stages   []  Not Ready for Return to Sports   Comments:                         PLAN: See eval  [x] Continue per plan of care [] Alter current plan (see comments above)  [] Plan of care initiated [] Hold pending MD visit [] Discharge    Electronically signed by:  Deepak Perkins PTA ; [facility] PT,MPT,ATC   Note: If patient does not return for scheduled/ recommended follow up visits, this note will serve as a discharge from care along with most recent update on progress.

## 2021-04-14 ENCOUNTER — HOSPITAL ENCOUNTER (OUTPATIENT)
Dept: PHYSICAL THERAPY | Age: 56
Setting detail: THERAPIES SERIES
Discharge: HOME | End: 2021-04-14
Payer: COMMERCIAL

## 2021-04-14 PROCEDURE — 97140 MANUAL THERAPY 1/> REGIONS: CPT | Performed by: PHYSICAL THERAPY ASSISTANT

## 2021-04-14 PROCEDURE — 97016 VASOPNEUMATIC DEVICE THERAPY: CPT | Performed by: PHYSICAL THERAPY ASSISTANT

## 2021-04-14 PROCEDURE — 97110 THERAPEUTIC EXERCISES: CPT | Performed by: PHYSICAL THERAPY ASSISTANT

## 2021-04-14 PROCEDURE — 97112 NEUROMUSCULAR REEDUCATION: CPT | Performed by: PHYSICAL THERAPY ASSISTANT

## 2021-04-14 NOTE — FLOWSHEET NOTE
[facility], [address] [place] [address] Honorio Mcgregor, [address]  Phone: [phone], [phone]             Physical Therapy Treatment Note/ Progress Report:     Date:  2021    Patient Name:  Purnima Washington    :  1965  MRN: [id number]  Restrictions/Precautions:    Medical/Treatment Diagnosis Information:  · Diagnosis: Closed Non-Displaced Fx 5th Metatarsal R Foot  · Treatment Diagnosis: C56.337O  Insurance/Certification information:  PT Insurance Information: [facility]  Physician Information:  Referring Practitioner: Alvin Lee  Has the plan of care been signed (Y/N):        []  Yes  [x]  No     Date of Patient follow up with Physician:     Is this a Progress Report:     []  Yes  [x]  No      If Yes:  Date Range for reporting period:  Initial Eval: 3/2/2021  Beginning: 3/2/2021 --- Endin2021    Progress report will be due (10 Rx or 30 days whichever is less): 9496     Recertification will be due (POC Duration  / 90 days whichever is less): 2021      Visit # Insurance Allowable Auth Required   In Person 10 12 visits   (2021-2021)  []  Yes     []  No    Tele Health -  []  Yes     []  No    Total 10       Functional Scale: LEFS: 75% (Score: 20/80)   Date assessed: 3/2/2021   LEFS:  61 = 24%       Date:  2021     Latex Allergy:  [x]NO      []YES  Preferred Language for Healthcare:   [x]English       []other:    Pain level:  3/10     SUBJECTIVE:  Saw MD yesterday and he is now awaiting approval for an MRI for the ankle. He has a hearing for the knee and shoulder on the  and once that gets approved he is planning to proceed with the knee surgery 1st and then address the shoulder. He will continue to remain off work. He received a brace for his ankle but feels it is too small - was instructed to bring this in on his next visit and we will look at its fit.   His metatarsal area continues to feel good although he does have a lot of swelling in the ankle which refers Towel Slide - 10 reps - 3 sets - 1x daily - 7x weekly   Seated Self Great Toe Stretch - 10 reps - 3 sets - 1x daily - 7x weekly                Patient Education         Therapeutic Exercise and NMR EXR  [x] (44106) Provided verbal/tactile cueing for activities related to strengthening, flexibility, endurance, ROM for improvements in LE, proximal hip, and core control with self care, mobility, lifting, ambulation. [x] (81477) Provided verbal/tactile cueing for activities related to improving balance, coordination, kinesthetic sense, posture, motor skill, proprioception to assist with LE, proximal hip, and core control in self-care, mobility, lifting, ambulation and eccentric single leg control. NMR and Therapeutic Activities:    [x] (59725 or 19530) Provided verbal/tactile cueing for activities related to improving balance, coordination, kinesthetic sense, posture, motor skill, proprioception and motor activation to allow for proper function of core, proximal hip and LE with self-care and ADLs and functional mobility.    [x] (55824) Gait Re-education- Provided training and instruction to the patient for proper LE, core and proximal hip recruitment and positioning and eccentric body weight control with ambulation re-education including up and down stairs     Home Exercise Program:    [x] (41285) Reviewed/Progressed HEP activities related to strengthening, flexibility, endurance, ROM of core, proximal hip and LE for functional self-care, mobility, lifting and ambulation/stair navigation   [x] (49952) Reviewed/Progressed HEP activities related to improving balance, coordination, kinesthetic sense, posture, motor skill, proprioception of core, proximal hip and LE for self-care, mobility, lifting, and ambulation/stair navigation      Manual Treatments:  PROM / STM / Oscillations-Mobs:  G-I, II, III, IV (PA's, Inf., Post.)  [x] (78170) Provided manual therapy to mobilize LE, proximal hip and/or LS spine soft tissue/joints for the purpose of modulating pain, promoting relaxation, increasing ROM, reducing/eliminating soft tissue swelling/inflammation/restriction, improving soft tissue extensibility and allowing for proper ROM for normal function with self-care, mobility, lifting and ambulation. Modalities:  Vaso 10' for compression due to swelling and pain relief     Charges:  Timed Code Treatment Minutes: 60'   Total Treatment Minutes:  60'   [facility]:  [facility] TIME:  MANUAL TIME:  UNTIMED MINUTES:  Medicare Total:  11:10-12:10  11:10-11:30  11:30-11:45  11:45-12:00  12:-12:10  -      [] EVAL (LOW) 37385 (typically 20 minutes face-to-face)  [] EVAL (MOD) 81784 (typically 30 minutes face-to-face)  [] EVAL (HIGH) 68667 (typically 45 minutes face-to-face)  [] RE-EVAL     [x] HD(67654) x  1  [] IONTO  [x] NMR (16736) x 1    [x] VASO  [x] Manual (79251) x 1    [] Other:  [] TA x      [] Mech Traction (73868)  [] ES(attended) (31805)      [] ES (un) (87438):    ASSESSMENT:  See eval    GOALS:   Short Term Goals: To be achieved in: 2 weeks  1. Independent in HEP and progression per patient tolerance, in order to prevent re-injury. []? Progressing: [x]? Met: []? Not Met: []? Adjusted       2. Patient will have a decrease in pain to facilitate improvement in movement, function, and ADLs as indicated by Functional Deficits. []? Progressing: [x]? Met: []? Not Met: []? Adjusted          Long Term Goals: To be achieved in: 12 weeks  1. Disability index score of 20% or less for the LEFS to assist with reaching prior level of function. [x]? Progressing: []? Met: []? Not Met: []? Adjusted      2. Patient will demonstrate increased AROM to equal the opposite side bilaterally to allow for proper joint functioning as indicated by patients Functional Deficits. []? Progressing: [x]? Met: []? Not Met: []? Adjusted       3.  Patient will demonstrate an increase in strength of R Ankle  = L Ankle to allow for proper functional mobility as indicated by patients Functional Deficits. [x]? Progressing: []? Met: []? Not Met: []? Adjusted       4. Patient will return to all transfers, work activities, and functional activities without increased symptoms or restriction. []? Progressing: [x]? Met: []? Not Met: []? Adjusted       5. Patient will have 0/10 pain with ADL's. [x]? Progressing: []? Met: []? Not Met: []? Adjusted       6. Patient stated goal:To be able to walk normal without boot and decrease pain (MET FOR THE METATARSAL)  []? Progressing: [x]? Met: []? Not Met: []? Adjusted                        Overall Progression Towards Functional goals/ Treatment Progress Update:  [x] Patient is progressing as expected towards functional goals listed. [] Progression is slowed due to complexities/Impairments listed. [] Progression has been slowed due to co-morbidities.   [] Plan just implemented, too soon to assess goals progression <30days   [] Goals require adjustment due to lack of progress  [] Patient is not progressing as expected and requires additional follow up with physician  [] Other    Prognosis for POC: [x] Good [] Fair  [] Poor    Patient requires continued skilled intervention: [x] Yes  [] No    Treatment/Activity Tolerance:  [x] Patient able to complete treatment  [] Patient limited by fatigue  [] Patient limited by pain    [] Patient limited by other medical complications  [] Other:     Return to Play: (if applicable)   []  Stage 1: Intro to Strength   []  Stage 2: Return to Run and Strength   []  Stage 3: Return to Jump and Strength   []  Stage 4: Dynamic Strength and Agility   []  Stage 5: Sport Specific Training     []  Ready to Return to Play, Meets All Above Stages   []  Not Ready for Return to Sports   Comments:                         PLAN: See eval  [x] Continue per plan of care [] Alter current plan (see comments above)  [] Plan of care initiated [] Hold pending MD visit [] Discharge    Electronically signed by: Gabriella Silver, PTA Note: If patient does not return for scheduled/ recommended follow up visits, this note will serve as a discharge from care along with most recent update on progress.

## 2021-04-19 ENCOUNTER — HOSPITAL ENCOUNTER (OUTPATIENT)
Dept: PHYSICAL THERAPY | Age: 56
Setting detail: THERAPIES SERIES
Discharge: HOME | End: 2021-04-19
Payer: COMMERCIAL

## 2021-04-19 PROCEDURE — 97016 VASOPNEUMATIC DEVICE THERAPY: CPT | Performed by: PHYSICAL THERAPY ASSISTANT

## 2021-04-19 PROCEDURE — 97140 MANUAL THERAPY 1/> REGIONS: CPT | Performed by: PHYSICAL THERAPY ASSISTANT

## 2021-04-19 PROCEDURE — 97112 NEUROMUSCULAR REEDUCATION: CPT | Performed by: PHYSICAL THERAPY ASSISTANT

## 2021-04-19 PROCEDURE — 97110 THERAPEUTIC EXERCISES: CPT | Performed by: PHYSICAL THERAPY ASSISTANT

## 2021-04-19 NOTE — FLOWSHEET NOTE
[facility], [address] [place] [address] Diego Mcgregor 66, [address]  Phone: [phone], [phone]             Physical Therapy Treatment Note/ Progress Report:     Date:  2021    Patient Name:  Sol Macedo    :  1965  MRN: [id number]  Restrictions/Precautions:    Medical/Treatment Diagnosis Information:  · Diagnosis: Closed Non-Displaced Fx 5th Metatarsal R Foot  · Treatment Diagnosis: I84.845F  Insurance/Certification information:  PT Insurance Information: [facility]  Physician Information:  Referring Practitioner: Sebastian Ratliff  Has the plan of care been signed (Y/N):        []  Yes  [x]  No     Date of Patient follow up with Physician:     Is this a Progress Report:     []  Yes  [x]  No      If Yes:  Date Range for reporting period:  Initial Eval: 3/2/2021  Beginning: 3/2/2021 --- Endin2021    Progress report will be due (10 Rx or 30 days whichever is less): 2517     Recertification will be due (POC Duration  / 90 days whichever is less): 2021      Visit # Insurance Allowable Auth Required   In Person 11 12 visits   (2021-2021)  []  Yes     []  No    Tele Health -  []  Yes     []  No    Total 11       Functional Scale: LEFS: 75% (Score: 20/80)   Date assessed: 3/2/2021   LEFS:  61 = 24%       Date:  2021     Latex Allergy:  [x]NO      []YES  Preferred Language for Healthcare:   [x]English       []other:    Pain level:  3/10     SUBJECTIVE:  States that on Friday he went to a [employer] house to help him work on his deck. He states that he fell through the deck with his right foot. He was in flip flops and demonstrates a scrape on his foot from where this happened.       OBJECTIVE: See eval   Observation:    Test measurements:      RESTRICTIONS/PRECAUTIONS: WBAT transition out of Boot     Exercises/Interventions:   Therapeutic Ex (23321)  Therapeutic Activity (70952)  NMR re-education (01451) Sets/Reps Notes/CUES   Bike 5'          ??, ??   CW, CCW        Long Sit HS Stretch 3 x 30\"    Long Sit Gastroc Stretch / Soleus stretch  3 x 30\" ea              Toe Towel Curls 2'    Towel ER/IR 20 x ea    Seated 1/2 moon DF / PF  2'    Seated 1/2 moon INV/EVR 2'    Seated HR  x30         Leg Press  80# x30         LAQ with DF / SAQ  2# x20 3\" hold         T-Band DF/PF  INV/EVR Green x20 ea               Weight Shifts  15 x ea ??, ?? Tandem Squat  X15     Mini Squat  X20    Slant Board  3x30\" ea gastroc and soleus          Leg Press  80# x30 / 60# x20 R, L  Held secondary to pain   FSU     Lateral step up and over     SLB  \"        Manual Intervention (66340)     Joint Mobs 5'    PROM  5'                             [address] access code:   Access Code: [email or web address]   URL: [email or web address]   Date: 03/02/2021   Prepared by: Pinky Dumont     Exercises   Seated Ankle Alphabet - 10 reps - 3 sets - 1x daily - 7x weekly   Seated Toe Raise - 10 reps - 3 sets - 1x daily - 7x weekly   Seated Heel Raise - 10 reps - 3 sets - 1x daily - 7x weekly   Supine Ankle Dorsiflexion and Plantarflexion AROM - 10 reps - 3 sets - 1x daily - 7x weekly   Supine Ankle Inversion AROM - 10 reps - 3 sets - 1x daily - 7x weekly   Supine Ankle Eversion AROM - 10 reps - 3 sets - 1x daily - 7x weekly   Seated Toe Curl - 10 reps - 3 sets - 1x daily - 7x weekly   Seated Long Arc Quad - 10 reps - 3 sets - 1x daily - 7x weekly   Long Sitting Calf Stretch with Strap - 5 reps - 1 sets - 30 hold - 1x daily - 7x weekly   Seated Table Hamstring Stretch - 5 reps - 1 sets - 30 hold - 1x daily - 7x weekly   Seated Ankle Dorsiflexion Stretch - 5 reps - 1 sets - 30 hold - 1x daily - 7x weekly   Ankle Inversion Eversion Towel Slide - 10 reps - 3 sets - 1x daily - 7x weekly   Seated Self Great Toe Stretch - 10 reps - 3 sets - 1x daily - 7x weekly                Patient Education         Therapeutic Exercise and NMR EXR  [x] (22843) Provided verbal/tactile cueing for activities related to strengthening, flexibility, endurance, ROM for improvements in LE, proximal hip, and core control with self care, mobility, lifting, ambulation. [x] (34664) Provided verbal/tactile cueing for activities related to improving balance, coordination, kinesthetic sense, posture, motor skill, proprioception to assist with LE, proximal hip, and core control in self-care, mobility, lifting, ambulation and eccentric single leg control. NMR and Therapeutic Activities:    [x] (87524 or 76370) Provided verbal/tactile cueing for activities related to improving balance, coordination, kinesthetic sense, posture, motor skill, proprioception and motor activation to allow for proper function of core, proximal hip and LE with self-care and ADLs and functional mobility. [x] (76959) Gait Re-education- Provided training and instruction to the patient for proper LE, core and proximal hip recruitment and positioning and eccentric body weight control with ambulation re-education including up and down stairs     Home Exercise Program:    [x] (73085) Reviewed/Progressed HEP activities related to strengthening, flexibility, endurance, ROM of core, proximal hip and LE for functional self-care, mobility, lifting and ambulation/stair navigation   [x] (89995) Reviewed/Progressed HEP activities related to improving balance, coordination, kinesthetic sense, posture, motor skill, proprioception of core, proximal hip and LE for self-care, mobility, lifting, and ambulation/stair navigation      Manual Treatments:  PROM / STM / Oscillations-Mobs:  G-I, II, III, IV (PA's, Inf., Post.)  [x] (27760) Provided manual therapy to mobilize LE, proximal hip and/or LS spine soft tissue/joints for the purpose of modulating pain, promoting relaxation, increasing ROM, reducing/eliminating soft tissue swelling/inflammation/restriction, improving soft tissue extensibility and allowing for proper ROM for normal function with self-care, mobility, lifting and ambulation.      Modalities: Vaso 10' for compression due to swelling and pain relief     Charges:  Timed Code Treatment Minutes: 61'   Total Treatment Minutes:  60'   [facility]:  [facility] TIME:  MANUAL TIME:  UNTIMED MINUTES:  Medicare Total:  11:00-12:00  11:00-11:15  11:15-11:30  11:30-11:45  11:45-12:00  -      [] EVAL (LOW) 76074 (typically 20 minutes face-to-face)  [] EVAL (MOD) 50878 (typically 30 minutes face-to-face)  [] EVAL (HIGH) 61157 (typically 45 minutes face-to-face)  [] RE-EVAL     [x] CC(48554) x  1  [] IONTO  [x] NMR (84231) x 1    [x] VASO  [x] Manual (86901) x 1    [] Other:  [] TA x      [] Mech Traction (74704)  [] ES(attended) (83921)      [] ES (un) (23366):    ASSESSMENT:  See eval    GOALS:   Short Term Goals: To be achieved in: 2 weeks  1. Independent in HEP and progression per patient tolerance, in order to prevent re-injury. []? Progressing: [x]? Met: []? Not Met: []? Adjusted       2. Patient will have a decrease in pain to facilitate improvement in movement, function, and ADLs as indicated by Functional Deficits. []? Progressing: [x]? Met: []? Not Met: []? Adjusted          Long Term Goals: To be achieved in: 12 weeks  1. Disability index score of 20% or less for the LEFS to assist with reaching prior level of function. [x]? Progressing: []? Met: []? Not Met: []? Adjusted      2. Patient will demonstrate increased AROM to equal the opposite side bilaterally to allow for proper joint functioning as indicated by patients Functional Deficits. []? Progressing: [x]? Met: []? Not Met: []? Adjusted       3. Patient will demonstrate an increase in strength of R Ankle  = L Ankle to allow for proper functional mobility as indicated by patients Functional Deficits. [x]? Progressing: []? Met: []? Not Met: []? Adjusted       4. Patient will return to all transfers, work activities, and functional activities without increased symptoms or restriction. []? Progressing: [x]? Met: []? Not Met: []? Adjusted       5.

## 2021-04-21 ENCOUNTER — HOSPITAL ENCOUNTER (OUTPATIENT)
Dept: PHYSICAL THERAPY | Age: 56
Setting detail: THERAPIES SERIES
Discharge: HOME | End: 2021-04-21
Payer: COMMERCIAL

## 2021-04-21 PROCEDURE — 97112 NEUROMUSCULAR REEDUCATION: CPT | Performed by: PHYSICAL THERAPY ASSISTANT

## 2021-04-21 PROCEDURE — 97110 THERAPEUTIC EXERCISES: CPT | Performed by: PHYSICAL THERAPY ASSISTANT

## 2021-04-21 PROCEDURE — 97140 MANUAL THERAPY 1/> REGIONS: CPT | Performed by: PHYSICAL THERAPY ASSISTANT

## 2021-04-21 PROCEDURE — 97016 VASOPNEUMATIC DEVICE THERAPY: CPT | Performed by: PHYSICAL THERAPY ASSISTANT

## 2021-04-21 NOTE — FLOWSHEET NOTE
[facility], [address] [place] [address] Diego Mcgregor 66, [address]  Phone: [phone], [phone]             Physical Therapy Treatment Note/ Progress Report:     Date:  2021    Patient Name:  Eligio Neal    :  1965  MRN: [id number]  Restrictions/Precautions:    Medical/Treatment Diagnosis Information:  · Diagnosis: Closed Non-Displaced Fx 5th Metatarsal R Foot  · Treatment Diagnosis: V69.042D  Insurance/Certification information:  PT Insurance Information: [facility]  Physician Information:  Referring Practitioner: Miguel Fu  Has the plan of care been signed (Y/N):        []  Yes  [x]  No     Date of Patient follow up with Physician:     Is this a Progress Report:     []  Yes  [x]  No      If Yes:  Date Range for reporting period:  Initial Eval: 3/2/2021  Beginning: 3/2/2021 --- Endin2021    Progress report will be due (10 Rx or 30 days whichever is less): 8854     Recertification will be due (POC Duration  / 90 days whichever is less): 2021      Visit # Insurance Allowable Auth Required   In Person 12 12 visits   (2021-2021)  []  Yes     []  No    Tele Health -  []  Yes     []  No    Total 12       Functional Scale: LEFS: 75% (Score: 20/80)   Date assessed: 3/2/2021   LEFS:  61 = 24%       Date:  2021     Latex Allergy:  [x]NO      []YES  Preferred Language for Healthcare:   [x]English       []other:    Pain level:  3/10     SUBJECTIVE:   Push mowed his yard which has two hills in it on Monday and has had increased swelling and pain out of the ankle and knee since then. Foot itself is still doing well. Today will be his last visit for his 5th metatarsal fx.   Goals are met in regards to his metatarsal fx - his pain and limitations at this point are secondary to the ankle which is awaiting an MRI approval.      OBJECTIVE: See eval   Observation:    Test measurements:      RESTRICTIONS/PRECAUTIONS: WBAT transition out of Boot     Exercises/Interventions: Therapeutic Ex (06772)  Therapeutic Activity (97018)  NMR re-education (02411) Sets/Reps Notes/CUES   Bike 5'          ??, ??   CW, CCW        Long Sit HS Stretch 3 x 30\"    Long Sit Gastroc Stretch / Soleus stretch  3 x 30\" ea              Toe Towel Curls 2'    Towel ER/IR 20 x ea    Seated 1/2 moon DF / PF  2'    Seated 1/2 moon INV/EVR 2'    Seated HR  x30                   LAQ with DF / SAQ  2# x20 3\" hold         T-Band DF/PF  INV/EVR Green x20 ea               Weight Shifts  15 x ea ??, ?? Tandem Squat  X15     Mini Squat  X20    Slant Board  3x30\" ea gastroc and soleus          Leg Press  80# x30 / 60# x20 R, L     FSU     Lateral step up and over     SLB  \"        Manual Intervention (99777)     Joint Mobs 5'    PROM  5'                             [address] access code:   Access Code: [email or web address]   URL: [employer]/   Date: 03/02/2021   Prepared by: Kathi Carbone     Exercises   Seated Ankle Alphabet - 10 reps - 3 sets - 1x daily - 7x weekly   Seated Toe Raise - 10 reps - 3 sets - 1x daily - 7x weekly   Seated Heel Raise - 10 reps - 3 sets - 1x daily - 7x weekly   Supine Ankle Dorsiflexion and Plantarflexion AROM - 10 reps - 3 sets - 1x daily - 7x weekly   Supine Ankle Inversion AROM - 10 reps - 3 sets - 1x daily - 7x weekly   Supine Ankle Eversion AROM - 10 reps - 3 sets - 1x daily - 7x weekly   Seated Toe Curl - 10 reps - 3 sets - 1x daily - 7x weekly   Seated Long Arc Quad - 10 reps - 3 sets - 1x daily - 7x weekly   Long Sitting Calf Stretch with Strap - 5 reps - 1 sets - 30 hold - 1x daily - 7x weekly   Seated Table Hamstring Stretch - 5 reps - 1 sets - 30 hold - 1x daily - 7x weekly   Seated Ankle Dorsiflexion Stretch - 5 reps - 1 sets - 30 hold - 1x daily - 7x weekly   Ankle Inversion Eversion Towel Slide - 10 reps - 3 sets - 1x daily - 7x weekly   Seated Self Great Toe Stretch - 10 reps - 3 sets - 1x daily - 7x weekly                Patient Education         Therapeutic Exercise and NMR EXR  [x] (78790) Provided verbal/tactile cueing for activities related to strengthening, flexibility, endurance, ROM for improvements in LE, proximal hip, and core control with self care, mobility, lifting, ambulation. [x] (49983) Provided verbal/tactile cueing for activities related to improving balance, coordination, kinesthetic sense, posture, motor skill, proprioception to assist with LE, proximal hip, and core control in self-care, mobility, lifting, ambulation and eccentric single leg control. NMR and Therapeutic Activities:    [x] (63522 or 73708) Provided verbal/tactile cueing for activities related to improving balance, coordination, kinesthetic sense, posture, motor skill, proprioception and motor activation to allow for proper function of core, proximal hip and LE with self-care and ADLs and functional mobility.    [x] (91386) Gait Re-education- Provided training and instruction to the patient for proper LE, core and proximal hip recruitment and positioning and eccentric body weight control with ambulation re-education including up and down stairs     Home Exercise Program:    [x] (82100) Reviewed/Progressed HEP activities related to strengthening, flexibility, endurance, ROM of core, proximal hip and LE for functional self-care, mobility, lifting and ambulation/stair navigation   [x] (83815) Reviewed/Progressed HEP activities related to improving balance, coordination, kinesthetic sense, posture, motor skill, proprioception of core, proximal hip and LE for self-care, mobility, lifting, and ambulation/stair navigation      Manual Treatments:  PROM / STM / Oscillations-Mobs:  G-I, II, III, IV (PA's, Inf., Post.)  [x] (65140) Provided manual therapy to mobilize LE, proximal hip and/or LS spine soft tissue/joints for the purpose of modulating pain, promoting relaxation, increasing ROM, reducing/eliminating soft tissue swelling/inflammation/restriction, improving soft tissue extensibility and allowing for proper ROM for normal function with self-care, mobility, lifting and ambulation. Modalities:  Vaso 10' for compression due to swelling and pain relief     Charges:  Timed Code Treatment Minutes: 60'   Total Treatment Minutes:  60'   150 [facility]:  [facility] TIME:  MANUAL TIME:  UNTIMED MINUTES:  Medicare Total:  11:00-12:00  11:00-11:15  11:15-11:30  11:30-11:45  11:45-12:00  -      [] EVAL (LOW) 78432 (typically 20 minutes face-to-face)  [] EVAL (MOD) 94926 (typically 30 minutes face-to-face)  [] EVAL (HIGH) 45184 (typically 45 minutes face-to-face)  [] RE-EVAL     [x] IM(26235) x  1  [] IONTO  [x] NMR (53129) x 1    [x] VASO  [x] Manual (39535) x 1    [] Other:  [] TA x      [] Mech Traction (78657)  [] ES(attended) (32695)      [] ES (un) (33071):    ASSESSMENT:  See eval    GOALS:   Short Term Goals: To be achieved in: 2 weeks  1. Independent in HEP and progression per patient tolerance, in order to prevent re-injury. []? Progressing: [x]? Met: []? Not Met: []? Adjusted       2. Patient will have a decrease in pain to facilitate improvement in movement, function, and ADLs as indicated by Functional Deficits. []? Progressing: [x]? Met: []? Not Met: []? Adjusted          Long Term Goals: To be achieved in: 12 weeks  1. Disability index score of 20% or less for the LEFS to assist with reaching prior level of function. [x]? Progressing: []? Met: []? Not Met: []? Adjusted      2. Patient will demonstrate increased AROM to equal the opposite side bilaterally to allow for proper joint functioning as indicated by patients Functional Deficits. []? Progressing: [x]? Met: []? Not Met: []? Adjusted       3. Patient will demonstrate an increase in strength of R Ankle  = L Ankle to allow for proper functional mobility as indicated by patients Functional Deficits. [x]? Progressing: []? Met: []? Not Met: []? Adjusted       4.  Patient will return to all transfers, work activities, and functional activities without increased symptoms or restriction. []? Progressing: [x]? Met: []? Not Met: []? Adjusted       5. Patient will have 0/10 pain with ADL's.  []? Progressing: [x]? Met: []? Not Met: []? Adjusted       6. Patient stated goal:To be able to walk normal without boot and decrease pain (MET FOR THE METATARSAL)  []? Progressing: [x]? Met: []? Not Met: []? Adjusted                        Overall Progression Towards Functional goals/ Treatment Progress Update:  [x] Patient is progressing as expected towards functional goals listed. [] Progression is slowed due to complexities/Impairments listed. [] Progression has been slowed due to co-morbidities. [] Plan just implemented, too soon to assess goals progression <30days   [] Goals require adjustment due to lack of progress  [] Patient is not progressing as expected and requires additional follow up with physician  [] Other    Prognosis for POC: [x] Good [] Fair  [] Poor    Patient requires continued skilled intervention: [x] Yes  [] No    Treatment/Activity Tolerance:  [x] Patient able to complete treatment  [] Patient limited by fatigue  [] Patient limited by pain    [] Patient limited by other medical complications  [] Other:     Return to Play: (if applicable)   []  Stage 1: Intro to Strength   []  Stage 2: Return to Run and Strength   []  Stage 3: Return to Jump and Strength   []  Stage 4: Dynamic Strength and Agility   []  Stage 5: Sport Specific Training     []  Ready to Return to Play, Meets All Above Stages   []  Not Ready for Return to Sports   Comments:                         PLAN: See eval  [] Continue per plan of care [] Alter current plan (see comments above)  [] Plan of care initiated [] Hold pending MD visit [x] Discharge    Electronically signed by:  Angelica Monreal PTA Note: If patient does not return for scheduled/ recommended follow up visits, this note will serve as a discharge from care along with most recent update on progress.

## 2021-07-15 ENCOUNTER — HOSPITAL ENCOUNTER (OUTPATIENT)
Dept: PHYSICAL THERAPY | Age: 56
Setting detail: THERAPIES SERIES
Discharge: HOME | End: 2021-07-15
Payer: COMMERCIAL

## 2021-07-15 NOTE — FLOWSHEET NOTE
[facility] [place]    Physical Therapy  Cancellation/No-show Note  Patient Name:  Danya Lugo  :  1965   Date:  7/15/2021    Cancelled visits to date: 1  No-shows to date: 0    For today's appointment patient:  [x]  Cancelled  []  Rescheduled appointment  []  No-show     Reason given by patient:  []  Patient ill  []  Conflicting appointment  []  No transportation    []  Conflict with work  []  No reason given  [x]  Other:     Comments:  C-9 for knee not approved   Phone call information:   [x]  Phone call made today to patient. []  Patient answered, conversation as follows:    []  Patient did not answer. []  Phone call not made today  []  Phone call not needed - pt contacted us to cancel and provided reason for cancellation.      Electronically signed by:  Otto Dodd, PT, MPT, ATC

## 2021-07-20 ENCOUNTER — HOSPITAL ENCOUNTER (OUTPATIENT)
Dept: PHYSICAL THERAPY | Age: 56
Setting detail: THERAPIES SERIES
Discharge: HOME | End: 2021-07-20
Payer: COMMERCIAL

## 2021-07-20 PROCEDURE — 97161 PT EVAL LOW COMPLEX 20 MIN: CPT | Performed by: PHYSICAL THERAPIST

## 2021-07-20 PROCEDURE — 97016 VASOPNEUMATIC DEVICE THERAPY: CPT | Performed by: PHYSICAL THERAPIST

## 2021-07-20 PROCEDURE — 97110 THERAPEUTIC EXERCISES: CPT | Performed by: PHYSICAL THERAPIST
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
## 2021-07-27 ENCOUNTER — HOSPITAL ENCOUNTER (OUTPATIENT)
Dept: PHYSICAL THERAPY | Age: 56
Setting detail: THERAPIES SERIES
End: 2021-07-27
Payer: COMMERCIAL

## 2021-07-27 ENCOUNTER — APPOINTMENT (OUTPATIENT)
Dept: PHYSICAL THERAPY | Age: 56
End: 2021-07-27
Payer: COMMERCIAL

## 2021-07-29 ENCOUNTER — HOSPITAL ENCOUNTER (OUTPATIENT)
Dept: PHYSICAL THERAPY | Age: 56
Setting detail: THERAPIES SERIES
Discharge: HOME | End: 2021-07-29
Payer: COMMERCIAL

## 2021-07-29 PROCEDURE — 97140 MANUAL THERAPY 1/> REGIONS: CPT | Performed by: PHYSICAL THERAPY ASSISTANT

## 2021-07-29 PROCEDURE — 97110 THERAPEUTIC EXERCISES: CPT | Performed by: PHYSICAL THERAPY ASSISTANT

## 2021-07-29 PROCEDURE — 97016 VASOPNEUMATIC DEVICE THERAPY: CPT | Performed by: PHYSICAL THERAPY ASSISTANT
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
## 2021-08-05 ENCOUNTER — HOSPITAL ENCOUNTER (OUTPATIENT)
Dept: PHYSICAL THERAPY | Age: 56
Setting detail: THERAPIES SERIES
Discharge: HOME | End: 2021-08-05
Payer: COMMERCIAL

## 2021-08-05 NOTE — FLOWSHEET NOTE
[facility])    Physical Therapy  Cancellation/No-show Note  Patient Name:  Keara Harrison  :  1965   Date:  2021    Cancelled visits to date: 2  No-shows to date: 1    For today's appointment patient:  [x]  Cancelled  []  Rescheduled appointment  []  No-show     Reason given by patient:  []  Patient ill  [x]  Conflicting appointment  []  No transportation    []  Conflict with work  []  No reason given  []  Other:     Comments:     Phone call information:   []  Phone call made today to patient. []  Patient answered, conversation as follows:    []  Patient did not answer. []  Phone call not made today  [x]  Phone call not needed - pt contacted us to cancel and provided reason for cancellation.      Electronically signed by:  Lori Rangel PT

## 2021-08-10 ENCOUNTER — HOSPITAL ENCOUNTER (OUTPATIENT)
Dept: PHYSICAL THERAPY | Age: 56
Setting detail: THERAPIES SERIES
Discharge: HOME | End: 2021-08-10
Payer: COMMERCIAL

## 2021-08-10 PROCEDURE — 97016 VASOPNEUMATIC DEVICE THERAPY: CPT | Performed by: PHYSICAL THERAPY ASSISTANT

## 2021-08-10 PROCEDURE — 97110 THERAPEUTIC EXERCISES: CPT | Performed by: PHYSICAL THERAPY ASSISTANT

## 2021-08-10 PROCEDURE — 97140 MANUAL THERAPY 1/> REGIONS: CPT | Performed by: PHYSICAL THERAPY ASSISTANT
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
## 2021-08-12 ENCOUNTER — HOSPITAL ENCOUNTER (OUTPATIENT)
Dept: PHYSICAL THERAPY | Age: 56
Setting detail: THERAPIES SERIES
Discharge: HOME | End: 2021-08-12
Payer: COMMERCIAL
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
## 2021-08-17 ENCOUNTER — HOSPITAL ENCOUNTER (OUTPATIENT)
Dept: PHYSICAL THERAPY | Age: 56
Setting detail: THERAPIES SERIES
Discharge: HOME | End: 2021-08-17
Payer: COMMERCIAL

## 2021-08-17 PROCEDURE — 97110 THERAPEUTIC EXERCISES: CPT | Performed by: PHYSICAL THERAPIST

## 2021-08-17 PROCEDURE — 97016 VASOPNEUMATIC DEVICE THERAPY: CPT | Performed by: PHYSICAL THERAPIST

## 2021-08-17 PROCEDURE — 97140 MANUAL THERAPY 1/> REGIONS: CPT | Performed by: PHYSICAL THERAPIST
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
## 2021-08-19 ENCOUNTER — HOSPITAL ENCOUNTER (OUTPATIENT)
Dept: PHYSICAL THERAPY | Age: 56
Setting detail: THERAPIES SERIES
Discharge: HOME | End: 2021-08-19
Payer: COMMERCIAL

## 2021-08-24 ENCOUNTER — HOSPITAL ENCOUNTER (OUTPATIENT)
Dept: PHYSICAL THERAPY | Age: 56
Setting detail: THERAPIES SERIES
Discharge: HOME | End: 2021-08-24
Payer: COMMERCIAL

## 2021-08-24 PROCEDURE — 97110 THERAPEUTIC EXERCISES: CPT | Performed by: PHYSICAL THERAPY ASSISTANT

## 2021-08-24 PROCEDURE — 97016 VASOPNEUMATIC DEVICE THERAPY: CPT | Performed by: PHYSICAL THERAPY ASSISTANT

## 2021-08-24 PROCEDURE — 97140 MANUAL THERAPY 1/> REGIONS: CPT | Performed by: PHYSICAL THERAPY ASSISTANT
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
## 2021-08-26 ENCOUNTER — HOSPITAL ENCOUNTER (OUTPATIENT)
Dept: PHYSICAL THERAPY | Age: 56
Setting detail: THERAPIES SERIES
Discharge: HOME | End: 2021-08-26
Payer: COMMERCIAL

## 2021-08-26 PROCEDURE — 97110 THERAPEUTIC EXERCISES: CPT | Performed by: PHYSICAL THERAPY ASSISTANT

## 2021-08-26 PROCEDURE — 97016 VASOPNEUMATIC DEVICE THERAPY: CPT | Performed by: PHYSICAL THERAPY ASSISTANT

## 2021-08-26 PROCEDURE — 97140 MANUAL THERAPY 1/> REGIONS: CPT | Performed by: PHYSICAL THERAPY ASSISTANT
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
